# Patient Record
Sex: MALE | Race: WHITE | NOT HISPANIC OR LATINO | Employment: OTHER | URBAN - METROPOLITAN AREA
[De-identification: names, ages, dates, MRNs, and addresses within clinical notes are randomized per-mention and may not be internally consistent; named-entity substitution may affect disease eponyms.]

---

## 2017-11-02 ENCOUNTER — DOCUMENTATION ONLY (OUTPATIENT)
Dept: OTHER | Facility: CLINIC | Age: 82
End: 2017-11-02

## 2017-11-02 PROBLEM — Z71.89 ACP (ADVANCE CARE PLANNING): Chronic | Status: ACTIVE | Noted: 2017-11-02

## 2017-11-30 ENCOUNTER — DOCUMENTATION ONLY (OUTPATIENT)
Dept: OTHER | Facility: CLINIC | Age: 82
End: 2017-11-30

## 2018-10-26 ENCOUNTER — DOCUMENTATION ONLY (OUTPATIENT)
Dept: OTHER | Facility: CLINIC | Age: 83
End: 2018-10-26

## 2018-10-26 PROBLEM — Z71.89 ACP (ADVANCE CARE PLANNING): Chronic | Status: RESOLVED | Noted: 2017-11-02 | Resolved: 2018-10-26

## 2021-04-27 ENCOUNTER — DOCUMENTATION ONLY (OUTPATIENT)
Dept: OTHER | Facility: CLINIC | Age: 86
End: 2021-04-27

## 2021-07-13 ENCOUNTER — LAB REQUISITION (OUTPATIENT)
Dept: LAB | Facility: CLINIC | Age: 86
End: 2021-07-13
Payer: COMMERCIAL

## 2021-07-13 DIAGNOSIS — Z29.9 ENCOUNTER FOR PROPHYLACTIC MEASURES, UNSPECIFIED: ICD-10-CM

## 2021-07-13 PROCEDURE — U0003 INFECTIOUS AGENT DETECTION BY NUCLEIC ACID (DNA OR RNA); SEVERE ACUTE RESPIRATORY SYNDROME CORONAVIRUS 2 (SARS-COV-2) (CORONAVIRUS DISEASE [COVID-19]), AMPLIFIED PROBE TECHNIQUE, MAKING USE OF HIGH THROUGHPUT TECHNOLOGIES AS DESCRIBED BY CMS-2020-01-R: HCPCS | Mod: ORL | Performed by: NURSE PRACTITIONER

## 2021-07-15 LAB — SARS-COV-2 RNA RESP QL NAA+PROBE: NEGATIVE

## 2021-07-29 ENCOUNTER — LAB REQUISITION (OUTPATIENT)
Dept: LAB | Facility: CLINIC | Age: 86
End: 2021-07-29
Payer: COMMERCIAL

## 2021-07-29 DIAGNOSIS — Z29.9 ENCOUNTER FOR PROPHYLACTIC MEASURES, UNSPECIFIED: ICD-10-CM

## 2021-07-30 ENCOUNTER — LAB REQUISITION (OUTPATIENT)
Dept: LAB | Facility: CLINIC | Age: 86
End: 2021-07-30
Payer: COMMERCIAL

## 2021-07-30 DIAGNOSIS — Z29.9 ENCOUNTER FOR PROPHYLACTIC MEASURES, UNSPECIFIED: ICD-10-CM

## 2021-07-30 PROCEDURE — U0005 INFEC AGEN DETEC AMPLI PROBE: HCPCS | Mod: ORL | Performed by: NURSE PRACTITIONER

## 2021-07-31 LAB — SARS-COV-2 RNA RESP QL NAA+PROBE: NEGATIVE

## 2021-08-02 ENCOUNTER — LAB REQUISITION (OUTPATIENT)
Dept: LAB | Facility: CLINIC | Age: 86
End: 2021-08-02
Payer: COMMERCIAL

## 2021-08-02 DIAGNOSIS — Z29.9 ENCOUNTER FOR PROPHYLACTIC MEASURES, UNSPECIFIED: ICD-10-CM

## 2021-08-02 PROCEDURE — U0005 INFEC AGEN DETEC AMPLI PROBE: HCPCS | Mod: ORL | Performed by: NURSE PRACTITIONER

## 2021-08-03 LAB — SARS-COV-2 RNA RESP QL NAA+PROBE: NEGATIVE

## 2021-08-04 ENCOUNTER — LAB REQUISITION (OUTPATIENT)
Dept: LAB | Facility: CLINIC | Age: 86
End: 2021-08-04
Payer: COMMERCIAL

## 2021-08-04 DIAGNOSIS — Z29.9 ENCOUNTER FOR PROPHYLACTIC MEASURES, UNSPECIFIED: ICD-10-CM

## 2021-08-05 PROCEDURE — U0005 INFEC AGEN DETEC AMPLI PROBE: HCPCS | Mod: ORL | Performed by: NURSE PRACTITIONER

## 2021-08-06 LAB — SARS-COV-2 RNA RESP QL NAA+PROBE: NEGATIVE

## 2021-08-09 ENCOUNTER — LAB REQUISITION (OUTPATIENT)
Dept: LAB | Facility: CLINIC | Age: 86
End: 2021-08-09
Payer: MEDICARE

## 2021-08-09 DIAGNOSIS — Z29.9 ENCOUNTER FOR PROPHYLACTIC MEASURES, UNSPECIFIED: ICD-10-CM

## 2021-08-09 PROCEDURE — U0003 INFECTIOUS AGENT DETECTION BY NUCLEIC ACID (DNA OR RNA); SEVERE ACUTE RESPIRATORY SYNDROME CORONAVIRUS 2 (SARS-COV-2) (CORONAVIRUS DISEASE [COVID-19]), AMPLIFIED PROBE TECHNIQUE, MAKING USE OF HIGH THROUGHPUT TECHNOLOGIES AS DESCRIBED BY CMS-2020-01-R: HCPCS | Mod: ORL | Performed by: NURSE PRACTITIONER

## 2021-08-10 LAB — SARS-COV-2 RNA RESP QL NAA+PROBE: NEGATIVE

## 2021-08-13 ENCOUNTER — LAB REQUISITION (OUTPATIENT)
Dept: LAB | Facility: CLINIC | Age: 86
End: 2021-08-13
Payer: MEDICARE

## 2021-08-13 DIAGNOSIS — Z29.9 ENCOUNTER FOR PROPHYLACTIC MEASURES, UNSPECIFIED: ICD-10-CM

## 2021-08-13 PROCEDURE — U0003 INFECTIOUS AGENT DETECTION BY NUCLEIC ACID (DNA OR RNA); SEVERE ACUTE RESPIRATORY SYNDROME CORONAVIRUS 2 (SARS-COV-2) (CORONAVIRUS DISEASE [COVID-19]), AMPLIFIED PROBE TECHNIQUE, MAKING USE OF HIGH THROUGHPUT TECHNOLOGIES AS DESCRIBED BY CMS-2020-01-R: HCPCS | Mod: ORL | Performed by: NURSE PRACTITIONER

## 2021-08-14 LAB — SARS-COV-2 RNA RESP QL NAA+PROBE: NEGATIVE

## 2021-08-16 ENCOUNTER — LAB REQUISITION (OUTPATIENT)
Dept: LAB | Facility: CLINIC | Age: 86
End: 2021-08-16
Payer: MEDICARE

## 2021-08-16 DIAGNOSIS — Z29.9 ENCOUNTER FOR PROPHYLACTIC MEASURES, UNSPECIFIED: ICD-10-CM

## 2021-08-16 PROCEDURE — U0005 INFEC AGEN DETEC AMPLI PROBE: HCPCS | Mod: ORL | Performed by: NURSE PRACTITIONER

## 2021-08-17 LAB — SARS-COV-2 RNA RESP QL NAA+PROBE: NEGATIVE

## 2021-08-18 ENCOUNTER — LAB REQUISITION (OUTPATIENT)
Dept: LAB | Facility: CLINIC | Age: 86
End: 2021-08-18
Payer: MEDICARE

## 2021-08-18 DIAGNOSIS — Z29.9 ENCOUNTER FOR PROPHYLACTIC MEASURES, UNSPECIFIED: ICD-10-CM

## 2021-08-19 PROCEDURE — U0003 INFECTIOUS AGENT DETECTION BY NUCLEIC ACID (DNA OR RNA); SEVERE ACUTE RESPIRATORY SYNDROME CORONAVIRUS 2 (SARS-COV-2) (CORONAVIRUS DISEASE [COVID-19]), AMPLIFIED PROBE TECHNIQUE, MAKING USE OF HIGH THROUGHPUT TECHNOLOGIES AS DESCRIBED BY CMS-2020-01-R: HCPCS | Mod: ORL | Performed by: NURSE PRACTITIONER

## 2021-08-20 LAB — SARS-COV-2 RNA RESP QL NAA+PROBE: NEGATIVE

## 2021-08-23 ENCOUNTER — LAB REQUISITION (OUTPATIENT)
Dept: LAB | Facility: CLINIC | Age: 86
End: 2021-08-23
Payer: MEDICARE

## 2021-08-23 DIAGNOSIS — Z29.9 ENCOUNTER FOR PROPHYLACTIC MEASURES, UNSPECIFIED: ICD-10-CM

## 2021-08-24 PROCEDURE — U0003 INFECTIOUS AGENT DETECTION BY NUCLEIC ACID (DNA OR RNA); SEVERE ACUTE RESPIRATORY SYNDROME CORONAVIRUS 2 (SARS-COV-2) (CORONAVIRUS DISEASE [COVID-19]), AMPLIFIED PROBE TECHNIQUE, MAKING USE OF HIGH THROUGHPUT TECHNOLOGIES AS DESCRIBED BY CMS-2020-01-R: HCPCS | Mod: ORL | Performed by: NURSE PRACTITIONER

## 2021-08-25 LAB — SARS-COV-2 RNA RESP QL NAA+PROBE: NEGATIVE

## 2021-08-26 ENCOUNTER — LAB REQUISITION (OUTPATIENT)
Dept: LAB | Facility: CLINIC | Age: 86
End: 2021-08-26
Payer: MEDICARE

## 2021-08-26 DIAGNOSIS — Z29.9 ENCOUNTER FOR PROPHYLACTIC MEASURES, UNSPECIFIED: ICD-10-CM

## 2021-08-30 PROCEDURE — U0005 INFEC AGEN DETEC AMPLI PROBE: HCPCS | Mod: ORL | Performed by: NURSE PRACTITIONER

## 2021-08-31 LAB — SARS-COV-2 RNA RESP QL NAA+PROBE: NEGATIVE

## 2021-09-15 ENCOUNTER — LAB REQUISITION (OUTPATIENT)
Dept: LAB | Facility: CLINIC | Age: 86
End: 2021-09-15
Payer: MEDICARE

## 2021-09-15 DIAGNOSIS — Z29.9 ENCOUNTER FOR PROPHYLACTIC MEASURES, UNSPECIFIED: ICD-10-CM

## 2021-09-15 PROCEDURE — U0005 INFEC AGEN DETEC AMPLI PROBE: HCPCS | Mod: ORL | Performed by: NURSE PRACTITIONER

## 2021-09-16 LAB — SARS-COV-2 RNA RESP QL NAA+PROBE: NEGATIVE

## 2021-09-20 ENCOUNTER — LAB REQUISITION (OUTPATIENT)
Dept: LAB | Facility: CLINIC | Age: 86
End: 2021-09-20
Payer: MEDICARE

## 2021-09-20 DIAGNOSIS — Z29.9 ENCOUNTER FOR PROPHYLACTIC MEASURES, UNSPECIFIED: ICD-10-CM

## 2021-09-21 PROCEDURE — U0003 INFECTIOUS AGENT DETECTION BY NUCLEIC ACID (DNA OR RNA); SEVERE ACUTE RESPIRATORY SYNDROME CORONAVIRUS 2 (SARS-COV-2) (CORONAVIRUS DISEASE [COVID-19]), AMPLIFIED PROBE TECHNIQUE, MAKING USE OF HIGH THROUGHPUT TECHNOLOGIES AS DESCRIBED BY CMS-2020-01-R: HCPCS | Mod: ORL | Performed by: NURSE PRACTITIONER

## 2021-09-24 LAB
SARS-COV-2 RNA RESP QL NAA+PROBE: NOT DETECTED
SPECIMEN SOURCE: NORMAL

## 2021-09-27 ENCOUNTER — LAB REQUISITION (OUTPATIENT)
Dept: LAB | Facility: CLINIC | Age: 86
End: 2021-09-27
Payer: MEDICARE

## 2021-09-27 DIAGNOSIS — Z29.9 ENCOUNTER FOR PROPHYLACTIC MEASURES, UNSPECIFIED: ICD-10-CM

## 2021-09-28 PROCEDURE — U0005 INFEC AGEN DETEC AMPLI PROBE: HCPCS | Mod: ORL | Performed by: NURSE PRACTITIONER

## 2021-09-29 LAB — SARS-COV-2 RNA RESP QL NAA+PROBE: NEGATIVE

## 2021-10-28 ENCOUNTER — LAB REQUISITION (OUTPATIENT)
Dept: LAB | Facility: CLINIC | Age: 86
End: 2021-10-28
Payer: MEDICARE

## 2021-10-28 DIAGNOSIS — Z29.9 ENCOUNTER FOR PROPHYLACTIC MEASURES, UNSPECIFIED: ICD-10-CM

## 2021-10-28 PROCEDURE — U0003 INFECTIOUS AGENT DETECTION BY NUCLEIC ACID (DNA OR RNA); SEVERE ACUTE RESPIRATORY SYNDROME CORONAVIRUS 2 (SARS-COV-2) (CORONAVIRUS DISEASE [COVID-19]), AMPLIFIED PROBE TECHNIQUE, MAKING USE OF HIGH THROUGHPUT TECHNOLOGIES AS DESCRIBED BY CMS-2020-01-R: HCPCS | Mod: ORL | Performed by: NURSE PRACTITIONER

## 2021-10-29 LAB — SARS-COV-2 RNA RESP QL NAA+PROBE: NEGATIVE

## 2021-11-01 ENCOUNTER — LAB REQUISITION (OUTPATIENT)
Dept: LAB | Facility: CLINIC | Age: 86
End: 2021-11-01
Payer: MEDICARE

## 2021-11-01 DIAGNOSIS — Z29.9 ENCOUNTER FOR PROPHYLACTIC MEASURES, UNSPECIFIED: ICD-10-CM

## 2021-11-01 PROCEDURE — U0005 INFEC AGEN DETEC AMPLI PROBE: HCPCS | Mod: ORL | Performed by: NURSE PRACTITIONER

## 2021-11-03 ENCOUNTER — LAB REQUISITION (OUTPATIENT)
Dept: LAB | Facility: CLINIC | Age: 86
End: 2021-11-03
Payer: MEDICARE

## 2021-11-03 DIAGNOSIS — Z29.9 ENCOUNTER FOR PROPHYLACTIC MEASURES, UNSPECIFIED: ICD-10-CM

## 2021-11-03 LAB — SARS-COV-2 RNA RESP QL NAA+PROBE: NOT DETECTED

## 2021-11-04 PROCEDURE — U0005 INFEC AGEN DETEC AMPLI PROBE: HCPCS | Mod: ORL | Performed by: NURSE PRACTITIONER

## 2021-11-06 LAB — SARS-COV-2 RNA RESP QL NAA+PROBE: NOT DETECTED

## 2021-11-07 ENCOUNTER — LAB REQUISITION (OUTPATIENT)
Dept: LAB | Facility: CLINIC | Age: 86
End: 2021-11-07
Payer: MEDICARE

## 2021-11-07 DIAGNOSIS — Z29.9 ENCOUNTER FOR PROPHYLACTIC MEASURES, UNSPECIFIED: ICD-10-CM

## 2021-11-08 PROCEDURE — U0003 INFECTIOUS AGENT DETECTION BY NUCLEIC ACID (DNA OR RNA); SEVERE ACUTE RESPIRATORY SYNDROME CORONAVIRUS 2 (SARS-COV-2) (CORONAVIRUS DISEASE [COVID-19]), AMPLIFIED PROBE TECHNIQUE, MAKING USE OF HIGH THROUGHPUT TECHNOLOGIES AS DESCRIBED BY CMS-2020-01-R: HCPCS | Mod: ORL | Performed by: NURSE PRACTITIONER

## 2021-11-10 ENCOUNTER — LAB REQUISITION (OUTPATIENT)
Dept: LAB | Facility: CLINIC | Age: 86
End: 2021-11-10
Payer: MEDICARE

## 2021-11-10 DIAGNOSIS — Z29.9 ENCOUNTER FOR PROPHYLACTIC MEASURES, UNSPECIFIED: ICD-10-CM

## 2021-11-11 LAB — SARS-COV-2 RNA RESP QL NAA+PROBE: NOT DETECTED

## 2021-11-11 PROCEDURE — U0003 INFECTIOUS AGENT DETECTION BY NUCLEIC ACID (DNA OR RNA); SEVERE ACUTE RESPIRATORY SYNDROME CORONAVIRUS 2 (SARS-COV-2) (CORONAVIRUS DISEASE [COVID-19]), AMPLIFIED PROBE TECHNIQUE, MAKING USE OF HIGH THROUGHPUT TECHNOLOGIES AS DESCRIBED BY CMS-2020-01-R: HCPCS | Mod: ORL | Performed by: NURSE PRACTITIONER

## 2021-11-14 LAB — SARS-COV-2 RNA RESP QL NAA+PROBE: NEGATIVE

## 2021-12-15 ENCOUNTER — LAB REQUISITION (OUTPATIENT)
Dept: LAB | Facility: CLINIC | Age: 86
End: 2021-12-15
Payer: MEDICARE

## 2021-12-15 DIAGNOSIS — Z29.9 ENCOUNTER FOR PROPHYLACTIC MEASURES, UNSPECIFIED: ICD-10-CM

## 2021-12-15 PROCEDURE — U0005 INFEC AGEN DETEC AMPLI PROBE: HCPCS | Mod: ORL | Performed by: NURSE PRACTITIONER

## 2021-12-16 LAB — SARS-COV-2 RNA RESP QL NAA+PROBE: NEGATIVE

## 2021-12-20 ENCOUNTER — LAB REQUISITION (OUTPATIENT)
Dept: LAB | Facility: CLINIC | Age: 86
End: 2021-12-20
Payer: MEDICARE

## 2021-12-20 DIAGNOSIS — Z29.9 ENCOUNTER FOR PROPHYLACTIC MEASURES, UNSPECIFIED: ICD-10-CM

## 2021-12-21 PROCEDURE — U0003 INFECTIOUS AGENT DETECTION BY NUCLEIC ACID (DNA OR RNA); SEVERE ACUTE RESPIRATORY SYNDROME CORONAVIRUS 2 (SARS-COV-2) (CORONAVIRUS DISEASE [COVID-19]), AMPLIFIED PROBE TECHNIQUE, MAKING USE OF HIGH THROUGHPUT TECHNOLOGIES AS DESCRIBED BY CMS-2020-01-R: HCPCS | Mod: ORL | Performed by: NURSE PRACTITIONER

## 2021-12-22 LAB — SARS-COV-2 RNA RESP QL NAA+PROBE: NEGATIVE

## 2021-12-30 ENCOUNTER — LAB REQUISITION (OUTPATIENT)
Dept: LAB | Facility: CLINIC | Age: 86
End: 2021-12-30
Payer: MEDICARE

## 2021-12-30 DIAGNOSIS — Z29.9 ENCOUNTER FOR PROPHYLACTIC MEASURES, UNSPECIFIED: ICD-10-CM

## 2021-12-30 PROCEDURE — U0005 INFEC AGEN DETEC AMPLI PROBE: HCPCS | Mod: ORL | Performed by: NURSE PRACTITIONER

## 2021-12-31 LAB — SARS-COV-2 RNA RESP QL NAA+PROBE: NEGATIVE

## 2022-01-04 ENCOUNTER — LAB REQUISITION (OUTPATIENT)
Dept: LAB | Facility: CLINIC | Age: 87
End: 2022-01-04
Payer: MEDICARE

## 2022-01-04 DIAGNOSIS — Z29.9 ENCOUNTER FOR PROPHYLACTIC MEASURES, UNSPECIFIED: ICD-10-CM

## 2022-01-04 PROCEDURE — U0003 INFECTIOUS AGENT DETECTION BY NUCLEIC ACID (DNA OR RNA); SEVERE ACUTE RESPIRATORY SYNDROME CORONAVIRUS 2 (SARS-COV-2) (CORONAVIRUS DISEASE [COVID-19]), AMPLIFIED PROBE TECHNIQUE, MAKING USE OF HIGH THROUGHPUT TECHNOLOGIES AS DESCRIBED BY CMS-2020-01-R: HCPCS | Mod: ORL | Performed by: NURSE PRACTITIONER

## 2022-01-06 LAB — SARS-COV-2 RNA RESP QL NAA+PROBE: NEGATIVE

## 2022-01-11 ENCOUNTER — LAB REQUISITION (OUTPATIENT)
Dept: LAB | Facility: CLINIC | Age: 87
End: 2022-01-11
Payer: MEDICARE

## 2022-01-11 DIAGNOSIS — Z29.9 ENCOUNTER FOR PROPHYLACTIC MEASURES, UNSPECIFIED: ICD-10-CM

## 2022-01-11 PROCEDURE — U0003 INFECTIOUS AGENT DETECTION BY NUCLEIC ACID (DNA OR RNA); SEVERE ACUTE RESPIRATORY SYNDROME CORONAVIRUS 2 (SARS-COV-2) (CORONAVIRUS DISEASE [COVID-19]), AMPLIFIED PROBE TECHNIQUE, MAKING USE OF HIGH THROUGHPUT TECHNOLOGIES AS DESCRIBED BY CMS-2020-01-R: HCPCS | Mod: ORL | Performed by: NURSE PRACTITIONER

## 2022-01-12 LAB — SARS-COV-2 RNA RESP QL NAA+PROBE: NEGATIVE

## 2022-01-17 ENCOUNTER — LAB REQUISITION (OUTPATIENT)
Dept: LAB | Facility: CLINIC | Age: 87
End: 2022-01-17
Payer: MEDICARE

## 2022-01-17 DIAGNOSIS — Z29.9 ENCOUNTER FOR PROPHYLACTIC MEASURES, UNSPECIFIED: ICD-10-CM

## 2022-01-18 PROCEDURE — U0005 INFEC AGEN DETEC AMPLI PROBE: HCPCS | Mod: ORL | Performed by: NURSE PRACTITIONER

## 2022-01-19 LAB — SARS-COV-2 RNA RESP QL NAA+PROBE: NEGATIVE

## 2022-01-24 ENCOUNTER — LAB REQUISITION (OUTPATIENT)
Dept: LAB | Facility: CLINIC | Age: 87
End: 2022-01-24
Payer: MEDICARE

## 2022-01-24 DIAGNOSIS — Z29.9 ENCOUNTER FOR PROPHYLACTIC MEASURES, UNSPECIFIED: ICD-10-CM

## 2022-01-25 PROCEDURE — U0005 INFEC AGEN DETEC AMPLI PROBE: HCPCS | Mod: ORL | Performed by: NURSE PRACTITIONER

## 2022-01-26 LAB — SARS-COV-2 RNA RESP QL NAA+PROBE: NEGATIVE

## 2022-01-31 ENCOUNTER — LAB REQUISITION (OUTPATIENT)
Dept: LAB | Facility: CLINIC | Age: 87
End: 2022-01-31
Payer: MEDICARE

## 2022-01-31 DIAGNOSIS — Z29.9 ENCOUNTER FOR PROPHYLACTIC MEASURES, UNSPECIFIED: ICD-10-CM

## 2022-02-01 PROCEDURE — U0005 INFEC AGEN DETEC AMPLI PROBE: HCPCS | Mod: ORL | Performed by: NURSE PRACTITIONER

## 2022-02-02 LAB — SARS-COV-2 RNA RESP QL NAA+PROBE: NEGATIVE

## 2022-04-21 ENCOUNTER — LAB REQUISITION (OUTPATIENT)
Dept: LAB | Facility: CLINIC | Age: 87
End: 2022-04-21
Payer: MEDICARE

## 2022-04-21 DIAGNOSIS — Z29.9 ENCOUNTER FOR PROPHYLACTIC MEASURES, UNSPECIFIED: ICD-10-CM

## 2022-04-21 PROCEDURE — U0003 INFECTIOUS AGENT DETECTION BY NUCLEIC ACID (DNA OR RNA); SEVERE ACUTE RESPIRATORY SYNDROME CORONAVIRUS 2 (SARS-COV-2) (CORONAVIRUS DISEASE [COVID-19]), AMPLIFIED PROBE TECHNIQUE, MAKING USE OF HIGH THROUGHPUT TECHNOLOGIES AS DESCRIBED BY CMS-2020-01-R: HCPCS | Mod: ORL | Performed by: NURSE PRACTITIONER

## 2022-04-22 LAB — SARS-COV-2 RNA RESP QL NAA+PROBE: NEGATIVE

## 2022-04-25 ENCOUNTER — LAB REQUISITION (OUTPATIENT)
Dept: LAB | Facility: CLINIC | Age: 87
End: 2022-04-25
Payer: MEDICARE

## 2022-04-25 DIAGNOSIS — Z29.9 ENCOUNTER FOR PROPHYLACTIC MEASURES, UNSPECIFIED: ICD-10-CM

## 2022-04-25 PROCEDURE — U0003 INFECTIOUS AGENT DETECTION BY NUCLEIC ACID (DNA OR RNA); SEVERE ACUTE RESPIRATORY SYNDROME CORONAVIRUS 2 (SARS-COV-2) (CORONAVIRUS DISEASE [COVID-19]), AMPLIFIED PROBE TECHNIQUE, MAKING USE OF HIGH THROUGHPUT TECHNOLOGIES AS DESCRIBED BY CMS-2020-01-R: HCPCS | Mod: ORL | Performed by: NURSE PRACTITIONER

## 2022-04-26 LAB — SARS-COV-2 RNA RESP QL NAA+PROBE: NEGATIVE

## 2022-04-27 ENCOUNTER — LAB REQUISITION (OUTPATIENT)
Dept: LAB | Facility: CLINIC | Age: 87
End: 2022-04-27
Payer: MEDICARE

## 2022-04-27 DIAGNOSIS — Z29.9 ENCOUNTER FOR PROPHYLACTIC MEASURES, UNSPECIFIED: ICD-10-CM

## 2022-04-28 PROCEDURE — U0005 INFEC AGEN DETEC AMPLI PROBE: HCPCS | Mod: ORL | Performed by: NURSE PRACTITIONER

## 2022-04-29 LAB — SARS-COV-2 RNA RESP QL NAA+PROBE: NEGATIVE

## 2022-05-03 ENCOUNTER — LAB REQUISITION (OUTPATIENT)
Dept: LAB | Facility: CLINIC | Age: 87
End: 2022-05-03
Payer: MEDICARE

## 2022-05-03 DIAGNOSIS — Z29.9 ENCOUNTER FOR PROPHYLACTIC MEASURES, UNSPECIFIED: ICD-10-CM

## 2022-05-03 PROCEDURE — U0005 INFEC AGEN DETEC AMPLI PROBE: HCPCS | Mod: ORL | Performed by: NURSE PRACTITIONER

## 2022-05-04 LAB — SARS-COV-2 RNA RESP QL NAA+PROBE: NEGATIVE

## 2022-05-05 ENCOUNTER — LAB REQUISITION (OUTPATIENT)
Dept: LAB | Facility: CLINIC | Age: 87
End: 2022-05-05
Payer: MEDICARE

## 2022-05-05 DIAGNOSIS — Z29.9 ENCOUNTER FOR PROPHYLACTIC MEASURES, UNSPECIFIED: ICD-10-CM

## 2022-05-06 PROCEDURE — U0005 INFEC AGEN DETEC AMPLI PROBE: HCPCS | Mod: ORL | Performed by: NURSE PRACTITIONER

## 2022-05-07 LAB — SARS-COV-2 RNA RESP QL NAA+PROBE: NEGATIVE

## 2022-05-09 ENCOUNTER — LAB REQUISITION (OUTPATIENT)
Dept: LAB | Facility: CLINIC | Age: 87
End: 2022-05-09
Payer: MEDICARE

## 2022-05-09 DIAGNOSIS — Z29.9 ENCOUNTER FOR PROPHYLACTIC MEASURES, UNSPECIFIED: ICD-10-CM

## 2022-05-09 PROCEDURE — U0003 INFECTIOUS AGENT DETECTION BY NUCLEIC ACID (DNA OR RNA); SEVERE ACUTE RESPIRATORY SYNDROME CORONAVIRUS 2 (SARS-COV-2) (CORONAVIRUS DISEASE [COVID-19]), AMPLIFIED PROBE TECHNIQUE, MAKING USE OF HIGH THROUGHPUT TECHNOLOGIES AS DESCRIBED BY CMS-2020-01-R: HCPCS | Mod: ORL | Performed by: NURSE PRACTITIONER

## 2022-05-10 LAB — SARS-COV-2 RNA RESP QL NAA+PROBE: NEGATIVE

## 2022-05-11 ENCOUNTER — LAB REQUISITION (OUTPATIENT)
Dept: LAB | Facility: CLINIC | Age: 87
End: 2022-05-11
Payer: MEDICARE

## 2022-05-11 DIAGNOSIS — Z29.9 ENCOUNTER FOR PROPHYLACTIC MEASURES, UNSPECIFIED: ICD-10-CM

## 2022-05-12 PROCEDURE — U0005 INFEC AGEN DETEC AMPLI PROBE: HCPCS | Mod: ORL | Performed by: NURSE PRACTITIONER

## 2022-05-13 LAB — SARS-COV-2 RNA RESP QL NAA+PROBE: NEGATIVE

## 2022-05-16 ENCOUNTER — LAB REQUISITION (OUTPATIENT)
Dept: LAB | Facility: CLINIC | Age: 87
End: 2022-05-16
Payer: MEDICARE

## 2022-05-16 DIAGNOSIS — Z29.9 ENCOUNTER FOR PROPHYLACTIC MEASURES, UNSPECIFIED: ICD-10-CM

## 2022-05-16 PROCEDURE — U0003 INFECTIOUS AGENT DETECTION BY NUCLEIC ACID (DNA OR RNA); SEVERE ACUTE RESPIRATORY SYNDROME CORONAVIRUS 2 (SARS-COV-2) (CORONAVIRUS DISEASE [COVID-19]), AMPLIFIED PROBE TECHNIQUE, MAKING USE OF HIGH THROUGHPUT TECHNOLOGIES AS DESCRIBED BY CMS-2020-01-R: HCPCS | Mod: ORL | Performed by: NURSE PRACTITIONER

## 2022-05-17 LAB — SARS-COV-2 RNA RESP QL NAA+PROBE: NEGATIVE

## 2022-05-18 ENCOUNTER — LAB REQUISITION (OUTPATIENT)
Dept: LAB | Facility: CLINIC | Age: 87
End: 2022-05-18
Payer: MEDICARE

## 2022-05-18 DIAGNOSIS — Z29.9 ENCOUNTER FOR PROPHYLACTIC MEASURES, UNSPECIFIED: ICD-10-CM

## 2022-05-19 PROCEDURE — U0005 INFEC AGEN DETEC AMPLI PROBE: HCPCS | Mod: ORL | Performed by: NURSE PRACTITIONER

## 2022-05-20 LAB — SARS-COV-2 RNA RESP QL NAA+PROBE: NEGATIVE

## 2022-05-23 ENCOUNTER — LAB REQUISITION (OUTPATIENT)
Dept: LAB | Facility: CLINIC | Age: 87
End: 2022-05-23
Payer: MEDICARE

## 2022-05-23 DIAGNOSIS — Z29.9 ENCOUNTER FOR PROPHYLACTIC MEASURES, UNSPECIFIED: ICD-10-CM

## 2022-05-23 PROCEDURE — U0005 INFEC AGEN DETEC AMPLI PROBE: HCPCS | Mod: ORL | Performed by: NURSE PRACTITIONER

## 2022-05-24 LAB — SARS-COV-2 RNA RESP QL NAA+PROBE: NEGATIVE

## 2022-05-25 ENCOUNTER — LAB REQUISITION (OUTPATIENT)
Dept: LAB | Facility: CLINIC | Age: 87
End: 2022-05-25
Payer: MEDICARE

## 2022-05-25 DIAGNOSIS — Z29.9 ENCOUNTER FOR PROPHYLACTIC MEASURES, UNSPECIFIED: ICD-10-CM

## 2022-05-26 PROCEDURE — U0003 INFECTIOUS AGENT DETECTION BY NUCLEIC ACID (DNA OR RNA); SEVERE ACUTE RESPIRATORY SYNDROME CORONAVIRUS 2 (SARS-COV-2) (CORONAVIRUS DISEASE [COVID-19]), AMPLIFIED PROBE TECHNIQUE, MAKING USE OF HIGH THROUGHPUT TECHNOLOGIES AS DESCRIBED BY CMS-2020-01-R: HCPCS | Mod: ORL | Performed by: NURSE PRACTITIONER

## 2022-05-27 LAB — SARS-COV-2 RNA RESP QL NAA+PROBE: NEGATIVE

## 2022-05-31 ENCOUNTER — LAB REQUISITION (OUTPATIENT)
Dept: LAB | Facility: CLINIC | Age: 87
End: 2022-05-31
Payer: MEDICARE

## 2022-05-31 DIAGNOSIS — Z29.9 ENCOUNTER FOR PROPHYLACTIC MEASURES, UNSPECIFIED: ICD-10-CM

## 2022-05-31 PROCEDURE — U0003 INFECTIOUS AGENT DETECTION BY NUCLEIC ACID (DNA OR RNA); SEVERE ACUTE RESPIRATORY SYNDROME CORONAVIRUS 2 (SARS-COV-2) (CORONAVIRUS DISEASE [COVID-19]), AMPLIFIED PROBE TECHNIQUE, MAKING USE OF HIGH THROUGHPUT TECHNOLOGIES AS DESCRIBED BY CMS-2020-01-R: HCPCS | Mod: ORL | Performed by: NURSE PRACTITIONER

## 2022-06-01 LAB — SARS-COV-2 RNA RESP QL NAA+PROBE: NEGATIVE

## 2022-06-02 ENCOUNTER — LAB REQUISITION (OUTPATIENT)
Dept: LAB | Facility: CLINIC | Age: 87
End: 2022-06-02
Payer: MEDICARE

## 2022-06-02 DIAGNOSIS — Z29.9 ENCOUNTER FOR PROPHYLACTIC MEASURES, UNSPECIFIED: ICD-10-CM

## 2022-06-03 PROCEDURE — U0005 INFEC AGEN DETEC AMPLI PROBE: HCPCS | Mod: ORL | Performed by: NURSE PRACTITIONER

## 2022-06-04 LAB — SARS-COV-2 RNA RESP QL NAA+PROBE: NEGATIVE

## 2022-06-06 ENCOUNTER — LAB REQUISITION (OUTPATIENT)
Dept: LAB | Facility: CLINIC | Age: 87
End: 2022-06-06
Payer: MEDICARE

## 2022-06-06 DIAGNOSIS — Z29.9 ENCOUNTER FOR PROPHYLACTIC MEASURES, UNSPECIFIED: ICD-10-CM

## 2022-06-06 PROCEDURE — U0003 INFECTIOUS AGENT DETECTION BY NUCLEIC ACID (DNA OR RNA); SEVERE ACUTE RESPIRATORY SYNDROME CORONAVIRUS 2 (SARS-COV-2) (CORONAVIRUS DISEASE [COVID-19]), AMPLIFIED PROBE TECHNIQUE, MAKING USE OF HIGH THROUGHPUT TECHNOLOGIES AS DESCRIBED BY CMS-2020-01-R: HCPCS | Mod: ORL | Performed by: NURSE PRACTITIONER

## 2022-06-07 LAB — SARS-COV-2 RNA RESP QL NAA+PROBE: NEGATIVE

## 2022-06-08 ENCOUNTER — LAB REQUISITION (OUTPATIENT)
Dept: LAB | Facility: CLINIC | Age: 87
End: 2022-06-08
Payer: MEDICARE

## 2022-06-08 DIAGNOSIS — Z29.9 ENCOUNTER FOR PROPHYLACTIC MEASURES, UNSPECIFIED: ICD-10-CM

## 2022-06-09 PROCEDURE — U0005 INFEC AGEN DETEC AMPLI PROBE: HCPCS | Mod: ORL | Performed by: NURSE PRACTITIONER

## 2022-06-10 LAB — SARS-COV-2 RNA RESP QL NAA+PROBE: NEGATIVE

## 2022-06-13 ENCOUNTER — LAB REQUISITION (OUTPATIENT)
Dept: LAB | Facility: CLINIC | Age: 87
End: 2022-06-13
Payer: MEDICARE

## 2022-06-13 DIAGNOSIS — Z29.9 ENCOUNTER FOR PROPHYLACTIC MEASURES, UNSPECIFIED: ICD-10-CM

## 2022-06-13 PROCEDURE — U0003 INFECTIOUS AGENT DETECTION BY NUCLEIC ACID (DNA OR RNA); SEVERE ACUTE RESPIRATORY SYNDROME CORONAVIRUS 2 (SARS-COV-2) (CORONAVIRUS DISEASE [COVID-19]), AMPLIFIED PROBE TECHNIQUE, MAKING USE OF HIGH THROUGHPUT TECHNOLOGIES AS DESCRIBED BY CMS-2020-01-R: HCPCS | Mod: ORL | Performed by: NURSE PRACTITIONER

## 2022-06-14 LAB — SARS-COV-2 RNA RESP QL NAA+PROBE: NEGATIVE

## 2022-11-14 ENCOUNTER — LAB REQUISITION (OUTPATIENT)
Dept: LAB | Facility: CLINIC | Age: 87
End: 2022-11-14
Payer: MEDICARE

## 2022-11-14 DIAGNOSIS — Z29.9 ENCOUNTER FOR PROPHYLACTIC MEASURES, UNSPECIFIED: ICD-10-CM

## 2022-11-14 LAB
ANION GAP SERPL CALCULATED.3IONS-SCNC: 5 MMOL/L (ref 3–14)
BUN SERPL-MCNC: 24 MG/DL (ref 7–30)
CALCIUM SERPL-MCNC: 9.5 MG/DL (ref 8.5–10.1)
CHLORIDE BLD-SCNC: 106 MMOL/L (ref 94–109)
CO2 SERPL-SCNC: 29 MMOL/L (ref 20–32)
CREAT SERPL-MCNC: 0.75 MG/DL (ref 0.66–1.25)
GFR SERPL CREATININE-BSD FRML MDRD: 86 ML/MIN/1.73M2
GLUCOSE BLD-MCNC: 112 MG/DL (ref 70–99)
POTASSIUM BLD-SCNC: 4.3 MMOL/L (ref 3.4–5.3)
SODIUM SERPL-SCNC: 140 MMOL/L (ref 133–144)

## 2022-11-14 PROCEDURE — 80048 BASIC METABOLIC PNL TOTAL CA: CPT | Mod: ORL | Performed by: NURSE PRACTITIONER

## 2022-11-14 PROCEDURE — 36415 COLL VENOUS BLD VENIPUNCTURE: CPT | Mod: ORL | Performed by: NURSE PRACTITIONER

## 2022-12-28 ENCOUNTER — LAB REQUISITION (OUTPATIENT)
Dept: LAB | Facility: CLINIC | Age: 87
End: 2022-12-28
Payer: MEDICARE

## 2022-12-28 DIAGNOSIS — Z29.9 ENCOUNTER FOR PROPHYLACTIC MEASURES, UNSPECIFIED: ICD-10-CM

## 2022-12-28 PROCEDURE — 87637 SARSCOV2&INF A&B&RSV AMP PRB: CPT | Mod: ORL | Performed by: NURSE PRACTITIONER

## 2022-12-29 LAB
FLUAV RNA SPEC QL NAA+PROBE: NEGATIVE
FLUBV RNA RESP QL NAA+PROBE: NEGATIVE
RSV RNA SPEC NAA+PROBE: NEGATIVE
SARS-COV-2 RNA RESP QL NAA+PROBE: NEGATIVE

## 2023-01-01 ENCOUNTER — NURSING HOME VISIT (OUTPATIENT)
Dept: GERIATRICS | Facility: CLINIC | Age: 88
End: 2023-01-01
Payer: MEDICARE

## 2023-01-01 ENCOUNTER — DOCUMENTATION ONLY (OUTPATIENT)
Dept: GERIATRICS | Facility: CLINIC | Age: 88
End: 2023-01-01
Payer: MEDICARE

## 2023-01-01 ENCOUNTER — TELEPHONE (OUTPATIENT)
Dept: GERIATRICS | Facility: CLINIC | Age: 88
End: 2023-01-01

## 2023-01-01 VITALS
RESPIRATION RATE: 18 BRPM | SYSTOLIC BLOOD PRESSURE: 150 MMHG | TEMPERATURE: 96.2 F | WEIGHT: 196.5 LBS | DIASTOLIC BLOOD PRESSURE: 58 MMHG | BODY MASS INDEX: 27.51 KG/M2 | OXYGEN SATURATION: 96 % | HEIGHT: 71 IN | HEART RATE: 52 BPM

## 2023-01-01 VITALS
WEIGHT: 181.8 LBS | BODY MASS INDEX: 25.45 KG/M2 | HEART RATE: 93 BPM | DIASTOLIC BLOOD PRESSURE: 75 MMHG | HEIGHT: 71 IN | SYSTOLIC BLOOD PRESSURE: 140 MMHG | TEMPERATURE: 97.5 F | RESPIRATION RATE: 18 BRPM | OXYGEN SATURATION: 94 %

## 2023-01-01 VITALS
RESPIRATION RATE: 18 BRPM | WEIGHT: 194.1 LBS | SYSTOLIC BLOOD PRESSURE: 180 MMHG | HEART RATE: 57 BPM | BODY MASS INDEX: 27.17 KG/M2 | OXYGEN SATURATION: 95 % | DIASTOLIC BLOOD PRESSURE: 97 MMHG | TEMPERATURE: 97.7 F | HEIGHT: 71 IN

## 2023-01-01 DIAGNOSIS — I10 PRIMARY HYPERTENSION: ICD-10-CM

## 2023-01-01 DIAGNOSIS — F32.A DEPRESSION, UNSPECIFIED DEPRESSION TYPE: ICD-10-CM

## 2023-01-01 DIAGNOSIS — F03.90 DEMENTIA WITHOUT BEHAVIORAL DISTURBANCE (H): Primary | ICD-10-CM

## 2023-01-01 DIAGNOSIS — Z86.79 HISTORY OF CORONARY ARTERY DISEASE: ICD-10-CM

## 2023-01-01 DIAGNOSIS — C61 PROSTATE CANCER METASTATIC TO MULTIPLE SITES (H): ICD-10-CM

## 2023-01-01 DIAGNOSIS — H91.90 HEARING LOSS, UNSPECIFIED HEARING LOSS TYPE, UNSPECIFIED LATERALITY: ICD-10-CM

## 2023-01-01 DIAGNOSIS — E78.5 HYPERLIPIDEMIA LDL GOAL <100: ICD-10-CM

## 2023-01-01 PROCEDURE — 99307 SBSQ NF CARE SF MDM 10: CPT | Performed by: INTERNAL MEDICINE

## 2023-01-01 PROCEDURE — 99308 SBSQ NF CARE LOW MDM 20: CPT | Performed by: INTERNAL MEDICINE

## 2023-01-01 PROCEDURE — 99310 SBSQ NF CARE HIGH MDM 45: CPT

## 2023-01-01 PROCEDURE — 99309 SBSQ NF CARE MODERATE MDM 30: CPT

## 2023-02-03 ENCOUNTER — DOCUMENTATION ONLY (OUTPATIENT)
Dept: OTHER | Facility: CLINIC | Age: 88
End: 2023-02-03
Payer: MEDICARE

## 2023-04-12 ENCOUNTER — NURSING HOME VISIT (OUTPATIENT)
Dept: GERIATRICS | Facility: CLINIC | Age: 88
End: 2023-04-12
Payer: MEDICARE

## 2023-04-12 VITALS
BODY MASS INDEX: 24.64 KG/M2 | WEIGHT: 176 LBS | SYSTOLIC BLOOD PRESSURE: 118 MMHG | TEMPERATURE: 98.1 F | RESPIRATION RATE: 17 BRPM | HEART RATE: 59 BPM | DIASTOLIC BLOOD PRESSURE: 48 MMHG | HEIGHT: 71 IN | OXYGEN SATURATION: 95 %

## 2023-04-12 DIAGNOSIS — Z87.898 HISTORY OF BRADYCARDIA: ICD-10-CM

## 2023-04-12 DIAGNOSIS — I10 PRIMARY HYPERTENSION: ICD-10-CM

## 2023-04-12 DIAGNOSIS — F03.90 DEMENTIA WITHOUT BEHAVIORAL DISTURBANCE (H): Primary | ICD-10-CM

## 2023-04-12 DIAGNOSIS — E78.5 HYPERLIPIDEMIA LDL GOAL <100: ICD-10-CM

## 2023-04-12 DIAGNOSIS — N40.0 BENIGN PROSTATIC HYPERPLASIA, UNSPECIFIED WHETHER LOWER URINARY TRACT SYMPTOMS PRESENT: ICD-10-CM

## 2023-04-12 DIAGNOSIS — I95.1 ORTHOSTATIC HYPOTENSION: ICD-10-CM

## 2023-04-12 DIAGNOSIS — C61 PROSTATE CANCER METASTATIC TO MULTIPLE SITES (H): ICD-10-CM

## 2023-04-12 DIAGNOSIS — H91.90 HEARING LOSS, UNSPECIFIED HEARING LOSS TYPE, UNSPECIFIED LATERALITY: ICD-10-CM

## 2023-04-12 DIAGNOSIS — L85.3 XEROSIS CUTIS: ICD-10-CM

## 2023-04-12 DIAGNOSIS — F32.A DEPRESSION, UNSPECIFIED DEPRESSION TYPE: ICD-10-CM

## 2023-04-12 DIAGNOSIS — Z86.79 HISTORY OF CORONARY ARTERY DISEASE: ICD-10-CM

## 2023-04-12 PROBLEM — R79.89 LACTATE BLOOD INCREASE: Status: ACTIVE | Noted: 2018-05-11

## 2023-04-12 PROBLEM — R55 SYNCOPE AND COLLAPSE: Status: ACTIVE | Noted: 2018-05-11

## 2023-04-12 PROBLEM — R11.10 VOMITING: Status: ACTIVE | Noted: 2017-12-22

## 2023-04-12 PROBLEM — R53.1 WEAKNESS: Status: ACTIVE | Noted: 2017-12-22

## 2023-04-12 PROCEDURE — 99309 SBSQ NF CARE MODERATE MDM 30: CPT

## 2023-04-12 RX ORDER — TAMSULOSIN HYDROCHLORIDE 0.4 MG/1
0.4 CAPSULE ORAL DAILY
COMMUNITY

## 2023-04-12 RX ORDER — CITALOPRAM HYDROBROMIDE 10 MG/1
10 TABLET ORAL DAILY
COMMUNITY

## 2023-04-12 RX ORDER — AMOXICILLIN 250 MG
1 CAPSULE ORAL DAILY
COMMUNITY

## 2023-04-12 NOTE — PROGRESS NOTES
[facility] GERIATRICS    PRIMARY CARE PROVIDER AND CLINIC:  RODRI Man CNP, [address] / [place] [address]  Chief Complaint   Patient presents with     [facility] Medical Record Number:  [id number]  Place of Service where encounter took place:  [facility] () [[id number]]    Chetan Heard  is a 89 year old  (12/27/1931), admitted to the above facility on 4/11/23.     HPI:    Past medical history includes CAD, HLD, hypertension, prostate cancer, dementia, [place], depression admitted to the facility following his previous facility closure ( [facility]) .  Discharge summary not available to me yet.    Patient is being seen today for care establishment.  He is sitting up in wheelchair in the common area.  History is limited.  Very hard of hearing.  Endorses no acute concerns.  He is eating fine per staff report.  Mood stable.  Hemodynamically stable.  Denies chest pain or shortness of breath.    CODE STATUS/ADVANCE DIRECTIVES DISCUSSION:  DNR only  ALLERGIES: No Known Allergies   PAST MEDICAL HISTORY: No past medical history on file.   PAST SURGICAL HISTORY:   has no past surgical history on file.  FAMILY HISTORY: family history is not on file.  SOCIAL HISTORY:     Patient's living condition: lives in a skilled nursing facility    Post Discharge Medication Reconciliation Status:   MED REC REQUIRED  Post Medication Reconciliation Status:  Discharge medications reconciled, continue medications without change       Current Outpatient Medications   Medication Sig     aspirin (ASA) 81 MG EC tablet Take 81 mg by mouth daily     citalopram (CELEXA) 10 MG tablet Take 10 mg by mouth daily     senna-docusate (SENOKOT-S/PERICOLACE) 8.6-50 MG tablet Take 1 tablet by mouth daily     tamsulosin (FLOMAX) 0.4 MG capsule Take 0.4 mg by mouth daily     No current facility-administered medications for this visit.       ROS:  Limited secondary to cognitive impairment but today pt reports  "fine.     Vitals:  /48   Pulse 59   Temp 98.1  F (36.7  C)   Resp 17   Ht 1.803 m (5' 11\")   Wt 79.8 kg (176 lb)   SpO2 95%   BMI 24.55 kg/m       Exam:  GENERAL APPEARANCE: In no acute distress, up in wheelchair, well groomed  HEENT-EARS: No discharge/drainage, [place]  HEENT-NECK: No lymphadenopathy  HEENT-EYES: PERRLA positive, no drainage/discharge  HEENT-NOSE/MOUTH/THROAT: No nasal drainage or erythema, mucous membranes moist  RESPIRATORY: Clear to auscultation, even and unlabored, symmetrical chest wall expansion  CARDIOVASCULAR: RRR, BLE nonpitting edema, S1/S2 normal  GASTROINTESTINAL: Soft, nontender, nondistended, bowel sounds present x4 quadrants  GENITOURINARY: No retention  MUSCULOSKELETAL: No joint deformities, gait not assessed  INTEGUMENTARY: BLE skin dry  NEUROLOGICAL: Alert to self, memory loss, confusion  PSYCHOLOGICAL: Cooperative, socially appropriate      Lab/Diagnostic data:  Recent labs in [facility] reviewed by me today.     ASSESSMENT/PLAN:  Dementia  Depression  [place]  -On no specific dementia medication, mood is stable  -Continue Celexa, assist with ADLs as indicated, monitor for changes in mood, behavior, and functional status    History of prostate cancer  BPH  -Previously graduated from hospice per chart review, no report of  symptoms  -Continue tamsulosin, PVR as needed, monitor for signs of retention    History of CAD  Hypertension  History of orthostatic hypotension  History of bradycardia  -Vitals stable since admitted to the facility, on no BP medications  -Continue aspirin, monitor BP and pulse, monitor for changes in condition    Hyperlipidemia  -On no statin    Slow transit constipation  -Stable on senna  -Monitor    Xerosis  -BLE dry skin  -Initiate emollient lotion            Electronically signed by:  Judith Toledo,DNP,APRN,AGNP-BC.             The above note was completed in part using Dragon voice recognition software. Although reviewed after completion, some word and " grammatical errors may occur. Please contact the author of this note with any questions.               The above note was completed in part using Dragon voice recognition software. Although reviewed after completion, some word and grammatical errors may occur. Please contact the author of this note with any questions.

## 2023-04-12 NOTE — LETTER
4/12/2023        RE: Chetan Heard  C/o Jina Heard  [address] [address]        [facility] GERIATRICS    PRIMARY CARE PROVIDER AND CLINIC:  RODRI Man [facility], [address] / [place] [address]  Chief Complaint   Patient presents with     [facility] Medical Record Number:  [id number]  Place of Service where encounter took place:  [facility] () [[id number]]    Chetan Heard  is a 89 year old  (12/27/1931), admitted to the above facility on 4/11/23.     HPI:    Past medical history includes CAD, HLD, hypertension, prostate cancer, dementia, [place], depression admitted to the facility following his previous facility closure ( [facility]) .  Discharge summary not available to me yet.    Patient is being seen today for care establishment.  He is sitting up in wheelchair in the common area.  History is limited.  Very hard of hearing.  Endorses no acute concerns.  He is eating fine per staff report.  Mood stable.  Hemodynamically stable.  Denies chest pain or shortness of breath.    CODE STATUS/ADVANCE DIRECTIVES DISCUSSION:  DNR only  ALLERGIES: No Known Allergies   PAST MEDICAL HISTORY: No past medical history on file.   PAST SURGICAL HISTORY:   has no past surgical history on file.  FAMILY HISTORY: family history is not on file.  SOCIAL HISTORY:     Patient's living condition: lives in a skilled nursing facility    Post Discharge Medication Reconciliation Status:   MED REC REQUIRED  Post Medication Reconciliation Status:  Discharge medications reconciled, continue medications without change       Current Outpatient Medications   Medication Sig     aspirin (ASA) 81 MG EC tablet Take 81 mg by mouth daily     citalopram (CELEXA) 10 MG tablet Take 10 mg by mouth daily     senna-docusate (SENOKOT-S/PERICOLACE) 8.6-50 MG tablet Take 1 tablet by mouth daily     tamsulosin (FLOMAX) 0.4 MG capsule Take 0.4 mg by mouth daily     No current  "facility-administered medications for this visit.       ROS:  Limited secondary to cognitive impairment but today pt reports fine.     Vitals:  /48   Pulse 59   Temp 98.1  F (36.7  C)   Resp 17   Ht 1.803 m (5' 11\")   Wt 79.8 kg (176 lb)   SpO2 95%   BMI 24.55 kg/m       Exam:  GENERAL APPEARANCE: In no acute distress, up in wheelchair, well groomed  HEENT-EARS: No discharge/drainage, [place]  HEENT-NECK: No lymphadenopathy  HEENT-EYES: PERRLA positive, no drainage/discharge  HEENT-NOSE/MOUTH/THROAT: No nasal drainage or erythema, mucous membranes moist  RESPIRATORY: Clear to auscultation, even and unlabored, symmetrical chest wall expansion  CARDIOVASCULAR: RRR, BLE nonpitting edema, S1/S2 normal  GASTROINTESTINAL: Soft, nontender, nondistended, bowel sounds present x4 quadrants  GENITOURINARY: No retention  MUSCULOSKELETAL: No joint deformities, gait not assessed  INTEGUMENTARY: BLE skin dry  NEUROLOGICAL: Alert to self, memory loss, confusion  PSYCHOLOGICAL: Cooperative, socially appropriate      Lab/Diagnostic data:  Recent labs in [facility] reviewed by me today.     ASSESSMENT/PLAN:  Dementia  Depression  [place]  -On no specific dementia medication, mood is stable  -Continue Celexa, assist with ADLs as indicated, monitor for changes in mood, behavior, and functional status    History of prostate cancer  BPH  -Previously graduated from hospice per chart review, no report of  symptoms  -Continue tamsulosin, PVR as needed, monitor for signs of retention    History of CAD  Hypertension  History of orthostatic hypotension  History of bradycardia  -Vitals stable since admitted to the facility, on no BP medications  -Continue aspirin, monitor BP and pulse, monitor for changes in condition    Hyperlipidemia  -On no statin    Slow transit constipation  -Stable on senna  -Monitor    Xerosis  -BLE dry skin  -Initiate emollient lotion            Electronically signed by:  Judith Toledo,DNP,APRN,AGNP-BC.             The " above note was completed in part using Dragon voice recognition software. Although reviewed after completion, some word and grammatical errors may occur. Please contact the author of this note with any questions.               The above note was completed in part using Dragon voice recognition software. Although reviewed after completion, some word and grammatical errors may occur. Please contact the author of this note with any questions.           Sincerely,        RODRI Man CNP

## 2023-05-17 NOTE — PROGRESS NOTES
[facility] GERIATRICS    PRIMARY CARE PROVIDER AND CLINIC:  Judith Toledo, RODRI CNP, [address] / [place] [address]  Chief Complaint   Patient presents with     [facility] Medical Record Number:  [id number]  Place of Service where encounter took place:  [facility] ()     Chetan Heard  is a 89 year old  (12/27/1931), admitted to the above facility from [facility] on 4/11/23..     Past medical history reviewed:  Dementia  Coronary artery disease  Depression  Hypertension  History of prostate cancer,  Recent details not currently available    Interval history unobtainable from patient secondary to cognitive impairment.  Nursing staff notes no acute concerns.  Oral intake has been adequate.  Vital signs of been stable      CODE STATUS/ADVANCE DIRECTIVES DISCUSSION:  No Order  DNR / DNI  ALLERGIES: No Known Allergies   PAST MEDICAL HISTORY:   Past Medical History:   Diagnosis Date     CAD (coronary artery disease) 1/5/2011    Formatting of this note might be different from the original. Angiogram 7/1/92 1/28/11- noted ST depression with intense emotional distress about IV placement.    2/1/11 cor angio 3VD,  of LAD, BMS x3 to RCA, plavix minimum of one year . Plavix stopped 1/2014 10/2011 - cardiology planned to do a CT angiogram to determine the status of his right sided stents due to angina, however not done     Edema 1/12/2011    Formatting of this note might be different from the original. Initial work up: Bilateral doppler US 1/2011: Short segment occlusive thrombus in superficial gastrocnemius vein right upper medial calf. Lower extremities otherwise negative for DVT. BNP normal (39)  CXR normal Creatinine (12/11/2014): 0.77   Lasix started 9/22/2015 but did not improve edema so stopped.  Does not bother patient. Not se     Hyperlipidemia 1/31/2011    Formatting of this note might be different from the original. On high dose atorvastatin 80 mg Lipid  "panel (2013) - TChol: 171  Tri  HDL: 44  LDL: 114     Hypertension 2023    Formatting of this note might be different from the original. 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 [place] Grade 9 Diagnosed 2014 when presented with acute urinary retention, PSA >90, multiple lytic bone lesions on imaging. Follows with Dr. Billings. Started Degarelix therapy 2014 (subcutaneous injections, presently q6 months).     Weakness 2017      PAST SURGICAL HISTORY:   has a past surgical history that includes hernia repair (); tonsillectomy; and Cv Coronary Angiogram (2011).  FAMILY HISTORY: family history includes Alzheimer Disease in his mother; Atrial fibrillation in his sister; Pancreatic Cancer in his father.  SOCIAL HISTORY:   reports that he has never smoked. He has never used smokeless tobacco. He reports that he does not currently use alcohol. He reports that he does not use drugs.  Patient's living condition: lives in a skilled nursing facility    Medications were reviewed by me today    Current Outpatient Medications   Medication Sig     aspirin (ASA) 81 MG EC tablet Take 81 mg by mouth daily     citalopram (CELEXA) 10 MG tablet Take 10 mg by mouth daily     senna-docusate (SENOKOT-S/PERICOLACE) 8.6-50 MG tablet Take 1 tablet by mouth daily     tamsulosin (FLOMAX) 0.4 MG capsule Take 0.4 mg by mouth daily     No current facility-administered medications for this visit.       ROS:  Unobtainable secondary to cognitive impairment.     Vitals:  /49   Pulse 63   Temp 97.9  F (36.6  C)   Resp 16   Ht 1.803 m (5' 11\")   Wt 82.3 kg (181 lb 8 oz)   SpO2 92%   BMI 25.31 kg/m    Exam:  Well-nourished appearing male, lying in the recliner in the common area.  Sleepy, alerts to name, oriented to self  HEENT: Poor dentition, no oral lesions  Hard of " hearing  Lungs clear  CV RRR  Abdomen soft, nontender, nondistended  Extremities without edema  Neuro: Sleepy, alerts to name.  Moves all extremities equally.  No tremor or rigidity.  Cranial nerves intact  Gait was not assessed    Lab/Diagnostic data:    Most Recent 3 CBC's:No lab results found.  Most Recent 3 BMP's:Recent Labs   Lab Test 11/14/22  1348      POTASSIUM 4.3   CHLORIDE 106   CO2 29   BUN 24   CR 0.75   ANIONGAP 5   RONNA 9.5   *     Most Recent 2 LFT's:No lab results found.      ASSESSMENT/PLAN:    Dementia,  Depression   stable mental and functional status  Plan: Long-term care, continue citalopram    History carotid artery disease  Hypertension  CV status appears stable  Plan: Continue aspirin    History of prostate cancer with reported widespread mets, several years ago, previously on hospice.  Unclear if patient has had any recent evaluation  Clinically he appears stable and I doubt that further evaluation and treatment would be beneficial      Arley Chowdhury MD

## 2023-05-18 ENCOUNTER — NURSING HOME VISIT (OUTPATIENT)
Dept: GERIATRICS | Facility: CLINIC | Age: 88
End: 2023-05-18
Payer: MEDICARE

## 2023-05-18 VITALS
RESPIRATION RATE: 16 BRPM | TEMPERATURE: 97.9 F | HEART RATE: 63 BPM | BODY MASS INDEX: 25.41 KG/M2 | HEIGHT: 71 IN | WEIGHT: 181.5 LBS | SYSTOLIC BLOOD PRESSURE: 130 MMHG | DIASTOLIC BLOOD PRESSURE: 49 MMHG | OXYGEN SATURATION: 92 %

## 2023-05-18 DIAGNOSIS — C61 PROSTATE CANCER METASTATIC TO MULTIPLE SITES (H): ICD-10-CM

## 2023-05-18 DIAGNOSIS — I10 PRIMARY HYPERTENSION: ICD-10-CM

## 2023-05-18 DIAGNOSIS — F32.A DEPRESSION, UNSPECIFIED DEPRESSION TYPE: ICD-10-CM

## 2023-05-18 DIAGNOSIS — H91.90 HEARING LOSS, UNSPECIFIED HEARING LOSS TYPE, UNSPECIFIED LATERALITY: ICD-10-CM

## 2023-05-18 DIAGNOSIS — F03.90 DEMENTIA WITHOUT BEHAVIORAL DISTURBANCE (H): Primary | ICD-10-CM

## 2023-05-18 PROCEDURE — 99305 1ST NF CARE MODERATE MDM 35: CPT | Performed by: INTERNAL MEDICINE

## 2023-05-18 NOTE — LETTER
5/18/2023        RE: Chetan Heard  C/o Jina Heard  [address] [address]        [facility] GERIATRICS    PRIMARY CARE PROVIDER AND CLINIC:  RODRI Man CNP, [address] / [place] [address]  Chief Complaint   Patient presents with     [facility] Medical Record Number:  [id number]  Place of Service where encounter took place:  [facility] ([facility])     Chetan Heard  is a 89 year old  (12/27/1931), admitted to the above facility from [facility] on 4/11/23..     Past medical history reviewed:  Dementia  Coronary artery disease  Depression  Hypertension  History of prostate cancer,  Recent details not currently available    Interval history unobtainable from patient secondary to cognitive impairment.  Nursing staff notes no acute concerns.  Oral intake has been adequate.  Vital signs of been stable      CODE STATUS/ADVANCE DIRECTIVES DISCUSSION:  No Order  DNR / DNI  ALLERGIES: No Known Allergies   PAST MEDICAL HISTORY:   Past Medical History:   Diagnosis Date     CAD (coronary artery disease) 1/5/2011    Formatting of this note might be different from the original. Angiogram 7/1/92 1/28/11- noted ST depression with intense emotional distress about IV placement.    2/1/11 cor angio 3VD,  of LAD, BMS x3 to RCA, plavix minimum of one year . Plavix stopped 1/2014 10/2011 - cardiology planned to do a CT angiogram to determine the status of his right sided stents due to angina, however not done     Edema 1/12/2011    Formatting of this note might be different from the original. Initial work up: Bilateral doppler US 1/2011: Short segment occlusive thrombus in superficial gastrocnemius vein right upper medial calf. Lower extremities otherwise negative for DVT. BNP normal (39)  CXR normal Creatinine (12/11/2014): 0.77   Lasix started 9/22/2015 but did not improve edema so stopped.  Does not bother patient. Not se     Hyperlipidemia  "2011    Formatting of this note might be different from the original. On high dose atorvastatin 80 mg Lipid panel (2013) - TChol: 171  Tri  HDL: 44  LDL: 114     Hypertension 2023    Formatting of this note might be different from the original. 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 [place] Grade 9 Diagnosed 2014 when presented with acute urinary retention, PSA >90, multiple lytic bone lesions on imaging. Follows with Dr. Billings. Started Degarelix therapy 2014 (subcutaneous injections, presently q6 months).     Weakness 2017      PAST SURGICAL HISTORY:   has a past surgical history that includes hernia repair (); tonsillectomy; and Cv Coronary Angiogram (2011).  FAMILY HISTORY: family history includes Alzheimer Disease in his mother; Atrial fibrillation in his sister; Pancreatic Cancer in his father.  SOCIAL HISTORY:   reports that he has never smoked. He has never used smokeless tobacco. He reports that he does not currently use alcohol. He reports that he does not use drugs.  Patient's living condition: lives in a skilled nursing facility    Medications were reviewed by me today    Current Outpatient Medications   Medication Sig     aspirin (ASA) 81 MG EC tablet Take 81 mg by mouth daily     citalopram (CELEXA) 10 MG tablet Take 10 mg by mouth daily     senna-docusate (SENOKOT-S/PERICOLACE) 8.6-50 MG tablet Take 1 tablet by mouth daily     tamsulosin (FLOMAX) 0.4 MG capsule Take 0.4 mg by mouth daily     No current facility-administered medications for this visit.       ROS:  Unobtainable secondary to cognitive impairment.     Vitals:  /49   Pulse 63   Temp 97.9  F (36.6  C)   Resp 16   Ht 1.803 m (5' 11\")   Wt 82.3 kg (181 lb 8 oz)   SpO2 92%   BMI 25.31 kg/m    Exam:  Well-nourished appearing male, lying in the recliner " in the common area.  Sleepy, alerts to name, oriented to self  HEENT: Poor dentition, no oral lesions  Hard of hearing  Lungs clear  CV RRR  Abdomen soft, nontender, nondistended  Extremities without edema  Neuro: Sleepy, alerts to name.  Moves all extremities equally.  No tremor or rigidity.  Cranial nerves intact  Gait was not assessed    Lab/Diagnostic data:    Most Recent 3 CBC's:No lab results found.  Most Recent 3 BMP's:Recent Labs   Lab Test 11/14/22  1348      POTASSIUM 4.3   CHLORIDE 106   CO2 29   BUN 24   CR 0.75   ANIONGAP 5   RONNA 9.5   *     Most Recent 2 LFT's:No lab results found.      ASSESSMENT/PLAN:    Dementia,  Depression   stable mental and functional status  Plan: Long-term care, continue citalopram    History carotid artery disease  Hypertension  CV status appears stable  Plan: Continue aspirin    History of prostate cancer with reported widespread mets, several years ago, previously on hospice.  Unclear if patient has had any recent evaluation  Clinically he appears stable and I doubt that further evaluation and treatment would be beneficial      Arley Chowdhury MD                    Sincerely,        Arley Chowdhury MD

## 2023-06-16 NOTE — LETTER
6/16/2023        RE: Chetan Heard  C[facility]o Jina Heard  [address] [facility] [address]        [facility] GERIATRICS  Chief Complaint   Patient presents with     long-term Regulatory     [place] Medical Record Number:  [id number]  Place of Service where encounter took place:  [facility] () [[id number]]    HPI:    Chetan Heard  is 89 year old (12/27/1931), who is being seen today for a federally mandated E/M visit. Today's concerns are: LTC follow-up visit    Patient recently admitted to the above facility from [facility] [place] on 4/11.    Today, he is sitting in wheelchair in the common area.  History very limited.  Also, he is very hard of hearing.  Reports he is doing okay.  No changes in condition since last visit.  Course reviewed with nursing staff.  No acute concerns at this time.    ALLERGIES:Patient has no known allergies.  PAST MEDICAL HISTORY:   Past Medical History:   Diagnosis Date     CAD (coronary artery disease) 1/5/2011    Formatting of this note might be different from the original. Angiogram 7/1/92 1/28/11- noted ST depression with intense emotional distress about IV placement.    2/1/11 cor angio 3VD,  of LAD, BMS x3 to RCA, plavix minimum of one year . Plavix stopped 1/2014 10/2011 - cardiology planned to do a CT angiogram to determine the status of his right sided stents due to angina, however decided that h     Edema 1/12/2011    Formatting of this note might be different from the original. Initial work up: Bilateral doppler US 1/2011: Short segment occlusive thrombus in superficial gastrocnemius vein right upper medial calf. Lower extremities otherwise negative for DVT. BNP normal (39)  CXR normal Creatinine (12/11/2014): 0.77   Lasix started 9/22/2015 but did not improve edema so stopped.  Does not bother patient. Not se     Hyperlipidemia 1/31/2011    Formatting of this note might be different from the original. On high dose atorvastatin 80 mg  Lipid panel (2013) - TChol: 171  Tri  HDL: 44  LDL: 114     Hypertension 2023    Formatting of this note might be different from the original. 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 Sravanthi Grade 9 Diagnosed 2014 when presented with acute urinary retention, PSA >90, multiple lytic bone lesions on imaging. Follows with Dr. Billings. Started Degarelix therapy 2014 (subcutaneous injections, presently q6 months).     Weakness 2017     PAST SURGICAL HISTORY:   has a past surgical history that includes hernia repair (); tonsillectomy; and Cv Coronary Angiogram (2011).  FAMILY HISTORY: family history includes Alzheimer Disease in his mother; Atrial fibrillation in his sister; Pancreatic Cancer in his father.  SOCIAL HISTORY:  reports that he has never smoked. He has never used smokeless tobacco. He reports that he does not currently use alcohol. He reports that he does not use drugs.    MEDICATIONS:  MED REC REQUIRED  Post Medication Reconciliation Status:  Patient was not discharged from an inpatient facility or TCU           Review of your medicines          Accurate as of 2023 11:59 PM. If you have any questions, ask your nurse or doctor.            CONTINUE these medicines which have NOT CHANGED      Dose / Directions   aspirin 81 MG EC tablet  Commonly known as: ASA      Dose: 81 mg  Take 81 mg by mouth daily  Refills: 0     citalopram 10 MG tablet  Commonly known as: celeXA      Dose: 10 mg  Take 10 mg by mouth daily  Refills: 0     senna-docusate 8.6-50 MG tablet  Commonly known as: SENOKOT-S/PERICOLACE      Dose: 1 tablet  Take 1 tablet by mouth daily  Refills: 0     tamsulosin 0.4 MG capsule  Commonly known as: FLOMAX      Dose: 0.4 mg  Take 0.4 mg by mouth daily  Refills: 0           Case Management:  I have reviewed the care plan  "and MDS and do agree with the plan. Patient's desire to return to the community is not assessible due to cognitive impairment. Information reviewed:  Medications, vital signs, orders, and nursing notes.    ROS:  Limited secondary to cognitive impairment but today pt reports ok.     Vitals:  BP (!) 140/75   Pulse 93   Temp 97.5  F (36.4  C)   Resp 18   Ht 1.803 m (5' 11\")   Wt 82.5 kg (181 lb 12.8 oz)   SpO2 94%   BMI 25.36 kg/m    Body mass index is 25.36 kg/m .     Exam:  GENERAL APPEARANCE: In no acute distress, up in wheelchair, well groomed  RESPIRATORY: Clear to auscultation, even and unlabored, symmetrical chest wall expansion  CARDIOVASCULAR: RRR, BLE nonpitting edema, S1/S2 normal  GASTROINTESTINAL: Soft, nontender, nondistended, bowel sounds present x4 quadrants  MUSCULOSKELETAL: No joint deformities, gait not assessed  NEUROLOGICAL: Alert to self, memory loss, confusion  PSYCHOLOGICAL: Calm     Lab/Diagnostic data:   Recent labs in [facility] reviewed by me today.     ASSESSMENT/PLAN  Dementia  Depression  [place]  -On no specific dementia medication, mood is stable  -Continue Celexa, assist with ADLs as indicated, monitor for changes in mood, behavior, and functional status    Hx of CAD  Hypertension  -Vitals stable, on no BP medications  -Continue aspirin, monitor BP and pulse, monitor for changes in condition         Electronically signed by:  Judith Toledo DNP,RODRI,AGNP-BC.               The above note was completed in part using Dragon voice recognition software. Although reviewed after completion, some word and grammatical errors may occur. Please contact the author of this note with any questions.                 Sincerely,        RODRI Man CNP      "

## 2023-06-16 NOTE — TELEPHONE ENCOUNTER
[facility] Geriatrics Triage Nurse Telephone Encounter    Provider: RODRI Huntley CNP  Facility: [facility] Facility Type:  LTC    Caller: Arelis    Allergies:  No Known Allergies     Reason for call: Nurse calling to update PCP that pt had episode of large emesis x1 and large loose stool x1 this morning. Denies nausea or abdominal pain and no further vomiting or loose stools noted so far. Eating and drinking well. Afebrile and VSS. Did receive morning dose of scheduled Senna.     Verbal Order/Direction given by Provider:   - Monitor VS and for further episodes of loose stools or N/V and notify provider  - If loose stools continue, hold Senna x 24 hours until resolves    Provider giving Order:  RODRI Huntley CNP    Verbal Order given to: Arelis Stone RN

## 2023-07-11 NOTE — PROGRESS NOTES
Patient was seen for regulatory long-term care visit.    Course reviewed with nursing home staff    Overall mental functional status has been stable  Patient is unable to contribute to the history secondary to cognitive impairment    Vital signs have been stable    Patient is alert, sitting in a chair in the dining area, leaning forward  He is alert, able to state his name.  Hard of hearing  Face symmetric, affect blunted  Lungs clear  CV regular rhythm  Abdomen soft, protuberant  No lower extremity edema  Neuro: Face symmetric.  No gross focal weakness.        Assessment/plan    Dementia, advanced, stable status since admission to this nursing home  Plan: Continue long-term care, Celexa    Hypertension  History of coronary artery disease  CV status appears stable  Plan: Continue aspirin, monitor vitals

## 2023-09-12 NOTE — PROGRESS NOTES
[facility] GERIATRICS  Chief Complaint   Patient presents with    Annual Comprehensive Nursing Home     [place] Medical Record Number:  [id number]  Place of Service where encounter took place:  [facility] () [[id number]]    HPI:    Chetan Heard  is a 89 year old  (1931), who is being seen today for an annual comprehensive visit. HPI information obtained from: facility chart records, facility staff, patient report, and [facility] chart review.     Found sitting up in the common area.  No new medical issues since last visit.  History limited.  Very hard of hearing.  Denies any physical concerns.  Course reviewed with nursing staff.  Patient remained stable since admission.  Breathing fine.  No acute concerns.    ALLERGIES: Patient has no known allergies.  PAST MEDICAL HISTORY:   Past Medical History:   Diagnosis Date    CAD (coronary artery disease) 2011    Formatting of this note might be different from the original. Angiogram 92- noted ST depression with intense emotional distress about IV placement.    11 cor angio 3VD,  of LAD, BMS x3 to RCA, plavix minimum of one year . Plavix stopped 2014 10/2011 - cardiology planned to do a CT angiogram to determine the status of his right sided stents due to angina, however decided that h    Edema 2011    Formatting of this note might be different from the original. Initial work up: Bilateral doppler US 2011: Short segment occlusive thrombus in superficial gastrocnemius vein right upper medial calf. Lower extremities otherwise negative for DVT. BNP normal (39)  CXR normal Creatinine (2014): 0.77   Lasix started 2015 but did not improve edema so stopped.  Does not bother patient. Not se    Hyperlipidemia 2011    Formatting of this note might be different from the original. On high dose atorvastatin 80 mg Lipid panel (2013) - TChol: 171  Tri  HDL: 44  LDL: 114    Hypertension 2023     Formatting of this note might be different from the original. Imdur CR 90 mg    Mild dementia (H) 11/21/2016    Prostate cancer metastatic to multiple sites (H) 11/14/2014    Formatting of this note might be different from the original. Sravanthi Grade 9 Diagnosed 11/2014 when presented with acute urinary retention, PSA >90, multiple lytic bone lesions on imaging. Follows with Dr. Billings. Started Degarelix therapy 11/2014 (subcutaneous injections, presently q6 months).    Weakness 12/22/2017      PAST SURGICAL HISTORY:  has a past surgical history that includes hernia repair (2001); tonsillectomy; and Cv Coronary Angiogram (02/01/2011).      Current Outpatient Medications:     aspirin (ASA) 81 MG EC tablet, Take 81 mg by mouth daily, Disp: , Rfl:     citalopram (CELEXA) 10 MG tablet, Take 10 mg by mouth daily, Disp: , Rfl:     senna-docusate (SENOKOT-S/PERICOLACE) 8.6-50 MG tablet, Take 1 tablet by mouth daily, Disp: , Rfl:     tamsulosin (FLOMAX) 0.4 MG capsule, Take 0.4 mg by mouth daily, Disp: , Rfl:      MED REC REQUIRED  Post Medication Reconciliation Status:  Patient was not discharged from an inpatient facility or TCU    Case Management:  I have reviewed the facility/SNF care plan/MDS, including the falls risk, nutrition and pain screening. I also reviewed the current immunizations, and preventive care.. Future cancer screening is not clinically indicated secondary to age/goals of care. Patient's desire to return to the community is not assessible due to cognitive impairment. Current Level of Care is appropriate.    Advance Directive Discussion:    I reviewed the current advanced directives as reflected in EPIC, the POLST and the facility chart, and verified the congruency of orders . I contacted the first party  and discussed the plan of care. I did not due to cognitive impairment review the advance directives with the resident.     Team Discussion:  I communicated with the appropriate disciplines involved  "with the Plan of Care: Nursing  .   Patient's goal is: unobtainable secondary to cognitive impairment.  Information reviewed: Medications, vital signs, orders, and nursing notes.    ROS:  Limited secondary to cognitive impairment but today pt reports fine.    Vitals:  BP (!) 150/58   Pulse 52   Temp (!) 96.2  F (35.7  C)   Resp 18   Ht 1.803 m (5' 11\")   Wt 89.1 kg (196 lb 8 oz)   SpO2 96%   BMI 27.41 kg/m   Body mass index is 27.41 kg/m .    Exam:  GENERAL APPEARANCE: In no acute distress, up in w/c   HEENT-EARS: No discharge/drainage, [place]  HEENT-NECK: No lymphadenopathy  HEENT-EYES: PERRLA positive, no drainage/discharge  HEENT-NOSE/MOUTH/THROAT: No nasal drainage or erythema   RESPIRATORY: Clear to auscultation, even and unlabored, symmetrical chest wall expansion  CARDIOVASCULAR: RRR, BLE nonpitting edema, S1/S2 normal  GASTROINTESTINAL: Soft, nontender, nondistended, bowel sounds present x4 quadrants  MUSCULOSKELETAL: No joint deformities, gait not assessed  INTEGUMENTARY: Visualized areas intact, no open skin  NEUROLOGICAL: Alert to self, memory loss, confusion  PSYCHOLOGICAL: Cooperative, calm        Lab/Diagnostic data:   Recent labs in [facility] reviewed by me today.     ASSESSMENT/PLAN:  Dementia  Depression  [place]  -On no specific dementia medication, mood stable  -Continue Celexa, continue to assist with ADLs as indicated, monitor for changes in mood, behavior, and functional status     History of prostate cancer  BPH  -No reports of  symptoms  -Continue tamsulosin, PVR as needed, monitor for signs of retention     History of CAD  Hypertension  Hyperlipidemia  History of orthostatic hypotension  History of bradycardia  -Vitals stable on no BP medications, no reports of chest pain or shortness of breath  -Continue aspirin, monitor BP and pulse, monitor for changes in condition       Slow transit constipation  -Stable on senna  -Continue current care, monitor bowel function         Electronically signed " by:  Judith Toledo DNP,APRN,LAURIP-BC.               The above note was completed in part using Dragon voice recognition software. Although reviewed after completion, some word and grammatical errors may occur. Please contact the author of this note with any questions.

## 2023-09-12 NOTE — LETTER
9/12/2023        RE: Chetan Heard  C/o Jina Heard  [address] [address]        [facility] GERIATRICS  Chief Complaint   Patient presents with     Annual Comprehensive Nursing Home     [place] Medical Record Number:  [id number]  Place of Service where encounter took place:  [facility] () [[id number]]    HPI:    Chetan Heard  is a 89 year old  (12/27/1931), who is being seen today for an annual comprehensive visit. HPI information obtained from: facility chart records, facility staff, patient report, and [facility] chart review.     Found sitting up in the common area.  No new medical issues since last visit.  History limited.  Very hard of hearing.  Denies any physical concerns.  Course reviewed with nursing staff.  Patient remained stable since admission.  Breathing fine.  No acute concerns.    ALLERGIES: Patient has no known allergies.  PAST MEDICAL HISTORY:   Past Medical History:   Diagnosis Date     CAD (coronary artery disease) 1/5/2011    Formatting of this note might be different from the original. Angiogram 7/1/92 1/28/11- noted ST depression with intense emotional distress about IV placement.    2/1/11 cor angio 3VD,  of LAD, BMS x3 to RCA, plavix minimum of one year . Plavix stopped 1/2014 10/2011 - cardiology planned to do a CT angiogram to determine the status of his right sided stents due to angina, however decided that h     Edema 1/12/2011    Formatting of this note might be different from the original. Initial work up: Bilateral doppler US 1/2011: Short segment occlusive thrombus in superficial gastrocnemius vein right upper medial calf. Lower extremities otherwise negative for DVT. BNP normal (39)  CXR normal Creatinine (12/11/2014): 0.77   Lasix started 9/22/2015 but did not improve edema so stopped.  Does not bother patient. Not se     Hyperlipidemia 1/31/2011    Formatting of this note might be different from the original. On high dose  atorvastatin 80 mg Lipid panel (2013) - TChol: 171  Tri  HDL: 44  LDL: 114     Hypertension 2023    Formatting of this note might be different from the original. 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 [place] Grade 9 Diagnosed 2014 when presented with acute urinary retention, PSA >90, multiple lytic bone lesions on imaging. Follows with Dr. Billings. Started Degarelix therapy 2014 (subcutaneous injections, presently q6 months).     Weakness 2017      PAST SURGICAL HISTORY:  has a past surgical history that includes hernia repair (); tonsillectomy; and Cv Coronary Angiogram (2011).      Current Outpatient Medications:      aspirin (ASA) 81 MG EC tablet, Take 81 mg by mouth daily, Disp: , Rfl:      citalopram (CELEXA) 10 MG tablet, Take 10 mg by mouth daily, Disp: , Rfl:      senna-docusate (SENOKOT-S/PERICOLACE) 8.6-50 MG tablet, Take 1 tablet by mouth daily, Disp: , Rfl:      tamsulosin (FLOMAX) 0.4 MG capsule, Take 0.4 mg by mouth daily, Disp: , Rfl:      MED REC REQUIRED  Post Medication Reconciliation Status:  Patient was not discharged from an inpatient facility or TCU    Case Management:  I have reviewed the facility/SNF care plan/MDS, including the falls risk, nutrition and pain screening. I also reviewed the current immunizations, and preventive care.. Future cancer screening is not clinically indicated secondary to age/goals of care. Patient's desire to return to the community is not assessible due to cognitive impairment. Current Level of Care is appropriate.    Advance Directive Discussion:    I reviewed the current advanced directives as reflected in EPIC, the POLST and the facility chart, and verified the congruency of orders . I contacted the first party  and discussed the plan of care. I did not due to cognitive impairment review  "the advance directives with the resident.     Team Discussion:  I communicated with the appropriate disciplines involved with the Plan of Care: Nursing  .   Patient's goal is: unobtainable secondary to cognitive impairment.  Information reviewed: Medications, vital signs, orders, and nursing notes.    ROS:  Limited secondary to cognitive impairment but today pt reports fine.    Vitals:  BP (!) 150/58   Pulse 52   Temp (!) 96.2  F (35.7  C)   Resp 18   Ht 1.803 m (5' 11\")   Wt 89.1 kg (196 lb 8 oz)   SpO2 96%   BMI 27.41 kg/m   Body mass index is 27.41 kg/m .    Exam:  GENERAL APPEARANCE: In no acute distress, up in w/c   HEENT-EARS: No discharge/drainage, [place]  HEENT-NECK: No lymphadenopathy  HEENT-EYES: PERRLA positive, no drainage/discharge  HEENT-NOSE/MOUTH/THROAT: No nasal drainage or erythema   RESPIRATORY: Clear to auscultation, even and unlabored, symmetrical chest wall expansion  CARDIOVASCULAR: RRR, BLE nonpitting edema, S1/S2 normal  GASTROINTESTINAL: Soft, nontender, nondistended, bowel sounds present x4 quadrants  MUSCULOSKELETAL: No joint deformities, gait not assessed  INTEGUMENTARY: Visualized areas intact, no open skin  NEUROLOGICAL: Alert to self, memory loss, confusion  PSYCHOLOGICAL: Cooperative, calm        Lab/Diagnostic data:   Recent labs in [facility] reviewed by me today.     ASSESSMENT/PLAN:  Dementia  Depression  [place]  -On no specific dementia medication, mood stable  -Continue Celexa, continue to assist with ADLs as indicated, monitor for changes in mood, behavior, and functional status     History of prostate cancer  BPH  -No reports of  symptoms  -Continue tamsulosin, PVR as needed, monitor for signs of retention     History of CAD  Hypertension  Hyperlipidemia  History of orthostatic hypotension  History of bradycardia  -Vitals stable on no BP medications, no reports of chest pain or shortness of breath  -Continue aspirin, monitor BP and pulse, monitor for changes in condition   "     Slow transit constipation  -Stable on senna  -Continue current care, monitor bowel function         Electronically signed by:  Judith Toledo DNP,RODRI,AGNP-BC.               The above note was completed in part using Dragon voice recognition software. Although reviewed after completion, some word and grammatical errors may occur. Please contact the author of this note with any questions.           Sincerely,        RODRI Man CNP

## 2023-11-06 NOTE — PROGRESS NOTES
[facility] GERIATRICS  Chief Complaint   Patient presents with    [facility] Medical Record Number:  [id number]  Place of Service where encounter took place:  [facility] ()     HPI:    Chetan Heard  is 89 year old (1931), who is being seen today for a federally mandated E/M visit.     Course reviewed with nursing home staff    Interval status has been stable  Patient does not volunteer any specific physical concerns.  He denies pain, cough, chest pain, shortness of breath    ALLERGIES:Patient has no known allergies.  PAST MEDICAL HISTORY:   Past Medical History:   Diagnosis Date    CAD (coronary artery disease) 2011    Formatting of this note might be different from the original. Angiogram 92- noted ST depression with intense emotional distress about IV placement.    11 cor angio 3VD,  of LAD, BMS x3 to RCA, plavix minimum of one year . Plavix stopped 2014 10/2011 - cardiology planned to do a CT angiogram to determine the status of his right sided stents due to angina, however decided that h    Edema 2011    Formatting of this note might be different from the original. Initial work up: Bilateral doppler US 2011: Short segment occlusive thrombus in superficial gastrocnemius vein right upper medial calf. Lower extremities otherwise negative for DVT. BNP normal (39)  CXR normal Creatinine (2014): 0.77   Lasix started 2015 but did not improve edema so stopped.  Does not bother patient. Not se    Hyperlipidemia 2011    Formatting of this note might be different from the original. On high dose atorvastatin 80 mg Lipid panel (2013) - TChol: 171  Tri  HDL: 44  LDL: 114    Hypertension 2023    Formatting of this note might be different from the original. Imdur CR 90 mg    Mild dementia (H) 2016    Prostate cancer metastatic to multiple sites (H) 2014    Formatting of this note might be  "different from the original. Sravanthi Grade 9 Diagnosed 11/2014 when presented with acute urinary retention, PSA >90, multiple lytic bone lesions on imaging. Follows with Dr. Billings. Started Degarelix therapy 11/2014 (subcutaneous injections, presently q6 months).    Weakness 12/22/2017     Medications were reviewed by me today        Vitals:  BP (!) 180/97   Pulse 57   Temp 97.7  F (36.5  C)   Resp 18   Ht 1.803 m (5' 11\")   Wt 88 kg (194 lb 1.6 oz)   SpO2 95%   BMI 27.07 kg/m    Body mass index is 27.07 kg/m .  Exam:  Obese male, lying in the recliner in the common area  Sleepy, easy alerts to name  Oriented to self  Poor dentition, hard of hearing  Lungs clear  CV RRR  Abdomen soft  No extremity edema  Neuro: Alert, moves all extremities equally.        ASSESSMENT/PLAN    Dementia  Depression  -- Mental functional status appears stable  Plan: Continue Celexa    History of prostate cancer with bone mets  When last seen in urology 2018, no further treatment recommended in view of stable disease and noncompliance with monitoring.    History of coronary disease  Hypertension, blood pressure recently elevated, on the blood pressure medications  Hyperlipidemia  Not currently on medications to treat, possibly secondary to past noncompliance with medications and treatments  Currently on aspirin alone  Plan: Monitor blood pressure, consider treatment (would avoid beta-blockers given history of bradycardia), statin  Clarify goals of care with legal rep      Arley Chowdhury MD    "

## 2023-11-06 NOTE — LETTER
2023        RE: Chetan Heard  C/o Jina Heard  [address] [facility] [address]        [facility] GERIATRICS  Chief Complaint   Patient presents with     jail Regulatory     [place] Medical Record Number:  [id number]  Place of Service where encounter took place:  [facility] ()     HPI:    Chetan Heard  is 89 year old (1931), who is being seen today for a federally mandated E/M visit.     Course reviewed with nursing home staff    Interval status has been stable  Patient does not volunteer any specific physical concerns.  He denies pain, cough, chest pain, shortness of breath    ALLERGIES:Patient has no known allergies.  PAST MEDICAL HISTORY:   Past Medical History:   Diagnosis Date     CAD (coronary artery disease) 2011    Formatting of this note might be different from the original. Angiogram 92- noted ST depression with intense emotional distress about IV placement.    11 cor angio 3VD,  of LAD, BMS x3 to RCA, plavix minimum of one year . Plavix stopped 2014 10/2011 - cardiology planned to do a CT angiogram to determine the status of his right sided stents due to angina, however decided that h     Edema 2011    Formatting of this note might be different from the original. Initial work up: Bilateral doppler US 2011: Short segment occlusive thrombus in superficial gastrocnemius vein right upper medial calf. Lower extremities otherwise negative for DVT. BNP normal (39)  CXR normal Creatinine (2014): 0.77   Lasix started 2015 but did not improve edema so stopped.  Does not bother patient. Not se     Hyperlipidemia 2011    Formatting of this note might be different from the original. On high dose atorvastatin 80 mg Lipid panel (2013) - TChol: 171  Tri  HDL: 44  LDL: 114     Hypertension 2023    Formatting of this note might be different from the original. Imdur CR 90 mg     Mild dementia (H)  "11/21/2016     Prostate cancer metastatic to multiple sites (H) 11/14/2014    Formatting of this note might be different from the original. [place] Grade 9 Diagnosed 11/2014 when presented with acute urinary retention, PSA >90, multiple lytic bone lesions on imaging. Follows with Dr. Billings. Started Degarelix therapy 11/2014 (subcutaneous injections, presently q6 months).     Weakness 12/22/2017     Medications were reviewed by me today        Vitals:  BP (!) 180/97   Pulse 57   Temp 97.7  F (36.5  C)   Resp 18   Ht 1.803 m (5' 11\")   Wt 88 kg (194 lb 1.6 oz)   SpO2 95%   BMI 27.07 kg/m    Body mass index is 27.07 kg/m .  Exam:  Obese male, lying in the recliner in the common area  Sleepy, easy alerts to name  Oriented to self  Poor dentition, hard of hearing  Lungs clear  CV RRR  Abdomen soft  No extremity edema  Neuro: Alert, moves all extremities equally.        ASSESSMENT/PLAN    Dementia  Depression  -- Mental functional status appears stable  Plan: Continue Celexa    History of prostate cancer with bone mets  When last seen in urology 2018, no further treatment recommended in view of stable disease and noncompliance with monitoring.    History of coronary disease  Hypertension, blood pressure recently elevated, on the blood pressure medications  Hyperlipidemia  Not currently on medications to treat, possibly secondary to past noncompliance with medications and treatments  Currently on aspirin alone  Plan: Monitor blood pressure, consider treatment (would avoid beta-blockers given history of bradycardia), statin  Clarify goals of care with legal rep      Arley Chowdhury MD      Sincerely,        Arley Chowdhury MD      "

## 2024-01-01 ENCOUNTER — TRANSFERRED RECORDS (OUTPATIENT)
Dept: HEALTH INFORMATION MANAGEMENT | Facility: CLINIC | Age: 89
End: 2024-01-01
Payer: MEDICARE

## 2024-01-01 ENCOUNTER — TELEPHONE (OUTPATIENT)
Dept: GERIATRICS | Facility: CLINIC | Age: 89
End: 2024-01-01
Payer: MEDICARE

## 2024-01-01 ENCOUNTER — NURSING HOME VISIT (OUTPATIENT)
Dept: GERIATRICS | Facility: CLINIC | Age: 89
End: 2024-01-01
Payer: MEDICARE

## 2024-01-01 VITALS
HEIGHT: 71 IN | DIASTOLIC BLOOD PRESSURE: 59 MMHG | OXYGEN SATURATION: 96 % | SYSTOLIC BLOOD PRESSURE: 125 MMHG | WEIGHT: 194.2 LBS | HEART RATE: 54 BPM | TEMPERATURE: 98.1 F | BODY MASS INDEX: 27.19 KG/M2 | RESPIRATION RATE: 18 BRPM

## 2024-01-01 VITALS
DIASTOLIC BLOOD PRESSURE: 64 MMHG | BODY MASS INDEX: 27.5 KG/M2 | HEIGHT: 71 IN | WEIGHT: 196.4 LBS | RESPIRATION RATE: 18 BRPM | OXYGEN SATURATION: 92 % | SYSTOLIC BLOOD PRESSURE: 153 MMHG | HEART RATE: 60 BPM | TEMPERATURE: 97.3 F

## 2024-01-01 VITALS
SYSTOLIC BLOOD PRESSURE: 107 MMHG | RESPIRATION RATE: 16 BRPM | BODY MASS INDEX: 26.26 KG/M2 | WEIGHT: 187.6 LBS | HEIGHT: 71 IN | DIASTOLIC BLOOD PRESSURE: 51 MMHG | TEMPERATURE: 97.3 F | HEART RATE: 57 BPM | OXYGEN SATURATION: 94 %

## 2024-01-01 DIAGNOSIS — N40.0 BENIGN PROSTATIC HYPERPLASIA, UNSPECIFIED WHETHER LOWER URINARY TRACT SYMPTOMS PRESENT: ICD-10-CM

## 2024-01-01 DIAGNOSIS — C61 PROSTATE CANCER METASTATIC TO MULTIPLE SITES (H): ICD-10-CM

## 2024-01-01 DIAGNOSIS — Z87.898 HISTORY OF BRADYCARDIA: ICD-10-CM

## 2024-01-01 DIAGNOSIS — F03.90 DEMENTIA WITHOUT BEHAVIORAL DISTURBANCE (H): Primary | ICD-10-CM

## 2024-01-01 DIAGNOSIS — I95.1 ORTHOSTATIC HYPOTENSION: ICD-10-CM

## 2024-01-01 DIAGNOSIS — S81.812A SKIN TEAR OF LEFT LOWER LEG WITHOUT COMPLICATION, INITIAL ENCOUNTER: Primary | ICD-10-CM
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
## 2024-01-01 DIAGNOSIS — E78.5 HYPERLIPIDEMIA LDL GOAL <100: ICD-10-CM

## 2024-01-01 DIAGNOSIS — I10 PRIMARY HYPERTENSION: ICD-10-CM

## 2024-01-01 DIAGNOSIS — H91.90 HEARING LOSS, UNSPECIFIED HEARING LOSS TYPE, UNSPECIFIED LATERALITY: ICD-10-CM

## 2024-01-01 DIAGNOSIS — Z86.79 HISTORY OF CORONARY ARTERY DISEASE: ICD-10-CM

## 2024-01-01 DIAGNOSIS — F32.A DEPRESSION, UNSPECIFIED DEPRESSION TYPE: ICD-10-CM

## 2024-01-01 DIAGNOSIS — F41.9 ANXIETY: Primary | ICD-10-CM

## 2024-01-01 PROCEDURE — 99310 SBSQ NF CARE HIGH MDM 45: CPT

## 2024-01-01 PROCEDURE — 99309 SBSQ NF CARE MODERATE MDM 30: CPT

## 2024-01-01 PROCEDURE — 99308 SBSQ NF CARE LOW MDM 20: CPT

## 2024-01-01 PROCEDURE — 99307 SBSQ NF CARE SF MDM 10: CPT | Performed by: INTERNAL MEDICINE

## 2024-01-01 RX ORDER — LORAZEPAM 2 MG/ML
0.5 CONCENTRATE ORAL EVERY 4 HOURS PRN
Qty: 30 ML | Refills: 0 | Status: SHIPPED | OUTPATIENT
Start: 2024-01-01

## 2024-01-16 NOTE — LETTER
1/16/2024        RE: Chetan Heard  C/o Jina Heard  [address] [facility] [address]        [facility] GERIATRICS  Chief Complaint   Patient presents with     FCI Regulatory     [place] Medical Record Number:  [id number]  Place of Service where encounter took place:  [facility] () [[id number]]    HPI:    Chetan Heard  is 89 year old (12/27/1931), who is being seen today for a federally mandated E/M visit. Today's concerns are: Routine LTC follow up visit    Sitting up in wheelchair in the common area.  History limited.  No acute change since last visit.  Patient denies concerns.  Course reviewed with staff.  Stable mental and functional status.  Pulse 50s-70s, asymptomatic.  BP stable.  Denies chest pain or shortness of breath.  Appetite stable.  About 20 pounds weight gain since April 2023.    ALLERGIES:Patient has no known allergies.  PAST MEDICAL HISTORY:   Past Medical History:   Diagnosis Date     CAD (coronary artery disease) 1/5/2011    Formatting of this note might be different from the original. Angiogram 7/1/92 1/28/11- noted ST depression with intense emotional distress about IV placement.    2/1/11 cor angio 3VD,  of LAD, BMS x3 to RCA, plavix minimum of one year . Plavix stopped 1/2014 10/2011 - cardiology planned to do a CT angiogram to determine the status of his right sided stents due to angina, however decided that h     Edema 1/12/2011    Formatting of this note might be different from the original. Initial work up: Bilateral doppler US 1/2011: Short segment occlusive thrombus in superficial gastrocnemius vein right upper medial calf. Lower extremities otherwise negative for DVT. BNP normal (39)  CXR normal Creatinine (12/11/2014): 0.77   Lasix started 9/22/2015 but did not improve edema so stopped.  Does not bother patient. Not se     Hyperlipidemia 1/31/2011    Formatting of this note might be different from the original. On high dose atorvastatin  80 mg Lipid panel (2013) - TChol: 171  Tri  HDL: 44  LDL: 114     Hypertension 2023    Formatting of this note might be different from the original. 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 [place] Grade 9 Diagnosed 2014 when presented with acute urinary retention, PSA >90, multiple lytic bone lesions on imaging. Follows with Dr. Billings. Started Degarelix therapy 2014 (subcutaneous injections, presently q6 months).     Weakness 2017     PAST SURGICAL HISTORY:   has a past surgical history that includes hernia repair (); tonsillectomy; and Cv Coronary Angiogram (2011).  FAMILY HISTORY: family history includes Alzheimer Disease in his mother; Atrial fibrillation in his sister; Pancreatic Cancer in his father.  SOCIAL HISTORY:  reports that he has never smoked. He has never used smokeless tobacco. He reports that he does not currently use alcohol. He reports that he does not use drugs.    MEDICATIONS:  MED REC REQUIRED  Post Medication Reconciliation Status: patient was not discharged from an inpatient facility or TCU       Review of your medicines            Accurate as of 2024  8:40 AM. If you have any questions, ask your nurse or doctor.                CONTINUE these medicines which have NOT CHANGED        Dose / Directions   aspirin 81 MG EC tablet  Commonly known as: ASA      Dose: 81 mg  Take 81 mg by mouth daily  Refills: 0     citalopram 10 MG tablet  Commonly known as: celeXA      Dose: 10 mg  Take 10 mg by mouth daily  Refills: 0     senna-docusate 8.6-50 MG tablet  Commonly known as: SENOKOT-S/PERICOLACE      Dose: 1 tablet  Take 1 tablet by mouth daily  Refills: 0     tamsulosin 0.4 MG capsule  Commonly known as: FLOMAX      Dose: 0.4 mg  Take 0.4 mg by mouth daily  Refills: 0             Case Management:  I have reviewed  "the care plan and MDS and do agree with the plan. Patient's desire to return to the community is not assessible due to cognitive impairment. Information reviewed:  Medications, vital signs, orders, and nursing notes.    ROS:  Limited secondary to cognitive impairment but today pt reports no pain.     Vitals:  BP (!) 153/64   Pulse 60   Temp 97.3  F (36.3  C)   Resp 18   Ht 1.803 m (5' 11\")   Wt 89.1 kg (196 lb 6.4 oz)   SpO2 92%   BMI 27.39 kg/m    Body mass index is 27.39 kg/m .    Exam:  ENERAL APPEARANCE: NAD, in w/c  HEENT-EARS: No discharge/drainage, [place]   RESPIRATORY: Clear to auscultation, even and unlabored, symmetrical chest wall expansion  CARDIOVASCULAR: RRR, BLE nonpitting edema, S1/S2 normal  GASTROINTESTINAL: Soft, nontender, nondistended, bowel sounds active   MUSCULOSKELETAL: In w/c   INTEGUMENTARY: Visualized areas intact, no open skin  NEUROLOGICAL: Alert to self, memory loss, confusion  PSYCHOLOGICAL: Cooperative, calm     Lab/Diagnostic data:   Recent labs in [facility] reviewed by me today.      ASSESSMENT/PLAN  Dementia  Depression  [place]  -On no specific dementia medication, mood stable  -Continue Celexa 10 mg daily, continue to assist with ADLs as indicated, monitor for changes in mood, behavior, and functional status     History of prostate cancer  BPH  -No reports of  symptoms  -Continue tamsulosin 0.4 mg daily, PVR as needed, monitor for signs of retention     History of CAD  Hypertension  Hyperlipidemia  History of orthostatic hypotension  History of bradycardia  -Pulse as above, BP stable on no medications, no reports of chest pain or shortness of breath  -Continue aspirin, monitor BP and pulse, monitor for changes in condition     Slow transit constipation  -Stable on senna  -Continue senna 1 tab daily, monitor bowel function         Electronically signed by:  Judith Toledo,AYESHA,APRN,AGNP-BC.               The above note was completed in part using Dragon voice recognition software. " Although reviewed after completion, some word and grammatical errors may occur. Please contact the author of this note with any questions.             Sincerely,        RODRI Man CNP

## 2024-01-16 NOTE — PROGRESS NOTES
[facility] GERIATRICS  Chief Complaint   Patient presents with    correction Regulatory     [place] Medical Record Number:  [id number]  Place of Service where encounter took place:  [facility] () [[id number]]    HPI:    Chetan Heard  is 89 year old (1931), who is being seen today for a federally mandated E/M visit. Today's concerns are: Routine LTC follow up visit    Sitting up in wheelchair in the common area.  History limited.  No acute change since last visit.  Patient denies concerns.  Course reviewed with staff.  Stable mental and functional status.  Pulse 50s-70s, asymptomatic.  BP stable.  Denies chest pain or shortness of breath.  Appetite stable.  About 20 pounds weight gain since 2023.    ALLERGIES:Patient has no known allergies.  PAST MEDICAL HISTORY:   Past Medical History:   Diagnosis Date    CAD (coronary artery disease) 2011    Formatting of this note might be different from the original. Angiogram 92- noted ST depression with intense emotional distress about IV placement.    11 cor angio 3VD,  of LAD, BMS x3 to RCA, plavix minimum of one year . Plavix stopped 2014 10/2011 - cardiology planned to do a CT angiogram to determine the status of his right sided stents due to angina, however decided that h    Edema 2011    Formatting of this note might be different from the original. Initial work up: Bilateral doppler US 2011: Short segment occlusive thrombus in superficial gastrocnemius vein right upper medial calf. Lower extremities otherwise negative for DVT. BNP normal (39)  CXR normal Creatinine (2014): 0.77   Lasix started 2015 but did not improve edema so stopped.  Does not bother patient. Not se    Hyperlipidemia 2011    Formatting of this note might be different from the original. On high dose atorvastatin 80 mg Lipid panel (2013) - TChol: 171  Tri  HDL: 44  LDL: 114    Hypertension 2023    Formatting  of this note might be different from the original. Imdur CR 90 mg    Mild dementia (H) 11/21/2016    Prostate cancer metastatic to multiple sites (H) 11/14/2014    Formatting of this note might be different from the original. [place] Grade 9 Diagnosed 11/2014 when presented with acute urinary retention, PSA >90, multiple lytic bone lesions on imaging. Follows with Dr. Billings. Started Degarelix therapy 11/2014 (subcutaneous injections, presently q6 months).    Weakness 12/22/2017     PAST SURGICAL HISTORY:   has a past surgical history that includes hernia repair (2001); tonsillectomy; and Cv Coronary Angiogram (02/01/2011).  FAMILY HISTORY: family history includes Alzheimer Disease in his mother; Atrial fibrillation in his sister; Pancreatic Cancer in his father.  SOCIAL HISTORY:  reports that he has never smoked. He has never used smokeless tobacco. He reports that he does not currently use alcohol. He reports that he does not use drugs.    MEDICATIONS:  MED REC REQUIRED  Post Medication Reconciliation Status: patient was not discharged from an inpatient facility or TCU       Review of your medicines            Accurate as of January 16, 2024  8:40 AM. If you have any questions, ask your nurse or doctor.                CONTINUE these medicines which have NOT CHANGED        Dose / Directions   aspirin 81 MG EC tablet  Commonly known as: ASA      Dose: 81 mg  Take 81 mg by mouth daily  Refills: 0     citalopram 10 MG tablet  Commonly known as: celeXA      Dose: 10 mg  Take 10 mg by mouth daily  Refills: 0     senna-docusate 8.6-50 MG tablet  Commonly known as: SENOKOT-S/PERICOLACE      Dose: 1 tablet  Take 1 tablet by mouth daily  Refills: 0     tamsulosin 0.4 MG capsule  Commonly known as: FLOMAX      Dose: 0.4 mg  Take 0.4 mg by mouth daily  Refills: 0             Case Management:  I have reviewed the care plan and MDS and do agree with the plan. Patient's desire to return to the community is not assessible due to  "cognitive impairment. Information reviewed:  Medications, vital signs, orders, and nursing notes.    ROS:  Limited secondary to cognitive impairment but today pt reports no pain.     Vitals:  BP (!) 153/64   Pulse 60   Temp 97.3  F (36.3  C)   Resp 18   Ht 1.803 m (5' 11\")   Wt 89.1 kg (196 lb 6.4 oz)   SpO2 92%   BMI 27.39 kg/m    Body mass index is 27.39 kg/m .    Exam:  ENERAL APPEARANCE: NAD, in w/c  HEENT-EARS: No discharge/drainage, [place]   RESPIRATORY: Clear to auscultation, even and unlabored, symmetrical chest wall expansion  CARDIOVASCULAR: RRR, BLE nonpitting edema, S1/S2 normal  GASTROINTESTINAL: Soft, nontender, nondistended, bowel sounds active   MUSCULOSKELETAL: In w/c   INTEGUMENTARY: Visualized areas intact, no open skin  NEUROLOGICAL: Alert to self, memory loss, confusion  PSYCHOLOGICAL: Cooperative, calm     Lab/Diagnostic data:   Recent labs in [facility] reviewed by me today.      ASSESSMENT/PLAN  Dementia  Depression  [place]  -On no specific dementia medication, mood stable  -Continue Celexa 10 mg daily, continue to assist with ADLs as indicated, monitor for changes in mood, behavior, and functional status     History of prostate cancer  BPH  -No reports of  symptoms  -Continue tamsulosin 0.4 mg daily, PVR as needed, monitor for signs of retention     History of CAD  Hypertension  Hyperlipidemia  History of orthostatic hypotension  History of bradycardia  -Pulse as above, BP stable on no medications, no reports of chest pain or shortness of breath  -Continue aspirin, monitor BP and pulse, monitor for changes in condition     Slow transit constipation  -Stable on senna  -Continue senna 1 tab daily, monitor bowel function         Electronically signed by:  Judith Toledo,AYESHA,APRN,AGNP-BC.               The above note was completed in part using Dragon voice recognition software. Although reviewed after completion, some word and grammatical errors may occur. Please contact the author of this note with " any questions.

## 2024-02-12 NOTE — TELEPHONE ENCOUNTER
[facility] Triage Nurse Telephone Encounter    Provider: RODRI Huntley CNP  Facility: [facility] Facility Type:  LT    Caller: Diana   Call Back Number: [phone]    Allergies:  No Known Allergies     Reason for call:   T 97.7, P 54 /51, O2 91% ra, R19. Still coughing         Verbal Order/Direction given by Provider: NNO    Provider giving Order:  RODRI Huntley CNP    Verbal Order given to: Diana Carey RN

## 2024-03-05 NOTE — PROGRESS NOTES
Patient was seen for regulatory long-term care visit    Course reviewed with nursing home staff    Overall mental and functional status have been stable per nursing staff    Minimal history is obtained from patient secondary to cognitive impairment    Current medications were reviewed by me today    Patient is sitting in recliner in the common area  He is sleepy, alerts to name, is oriented to self.  Appears comfortable  HEENT: Oral mucosa moist  Affect blunted  Lungs clear  CV RRR  Abdomen soft, protuberant  No extremity edema  Neuro: Generalized weakness, no focal weakness noted      Assessment/plan    Dementia, stable mental functional status  History of depression, on citalopram  Recent weight gain of 20 pounds over the last 9 months  Plan: Continue current cares, assure appropriate diet    Hyperlipidemia  History of coronary artery disease  Not on statin  Plan: Routine lab monitoring    History of BPH  No evidence of urinary retention  Plan: Continue tamsulosin  Monitor for signs of renal retention      Arley Chowdhury MD

## 2024-03-27 NOTE — PROGRESS NOTES
"[facility] GERIATRICS    Chief Complaint   Patient presents with    Nursing Home Acute     HPI:  Chetan Heard is a 89 year old  (12/27/1931), who is being seen today for an episodic care visit at: [facility] () [[id number]]. Today's concern is: Left shin skin tear    I was asked by staff to see patient for left shin skin tear.  Today, pt found sitting up in wheelchair in the common area.  History limited.  Patient does not recall what happened to his left shin.  No pain.  Noted to have small scattered dry ablations to his left shin without signs of complications.     Of note, patient was seen in ED on 1/24 after a fall with head strike.  Imaging without acute intracranial pathology.      Afebrile.  No chest pain or shortness of breath.    Allergies, and PMH/PSH reviewed in [facility] today.  REVIEW OF SYSTEMS:  Limited secondary to cognitive impairment but today pt reports fine.     Objective:   /59   Pulse 54   Temp 98.1  F (36.7  C)   Resp 18   Ht 1.803 m (5' 11\")   Wt 88.1 kg (194 lb 3.2 oz)   SpO2 96%   BMI 27.09 kg/m      Exam:  ENERAL APPEARANCE: In no acute distress  RESPIRATORY: Clear to auscultation, even and unlabored, symmetrical chest wall expansion  CARDIOVASCULAR: RRR, BLE nonpitting edema, S1/S2 normal  GASTROINTESTINAL: Soft, nontender, nondistended, bowel sounds active   INTEGUMENTARY: as above   NEUROLOGICAL: Alert to self, memory loss, confusion  PSYCHOLOGICAL: Calm        Recent labs in [facility] reviewed by me today.     Assessment/Plan:  Left shin skin tear  -Suspect mild trauma, no signs of complications, no drainage  -Monitor skin integrity     Fall  -As above  -Fall precautions    MED REC REQUIRED  Post Medication Reconciliation Status: patient was not discharged from an inpatient facility or TCU           Electronically signed by:  Judith Toledo,AYESHA,APRN,AGNP-BC.               The above note was completed in part using Dragon voice recognition software. Although reviewed " after completion, some word and grammatical errors may occur. Please contact the author of this note with any questions.

## 2024-03-27 NOTE — LETTER
"    3/27/2024        RE: Chetan Heard  C/o Jina Heard  [address] [address]        [facility] GERIATRICS    Chief Complaint   Patient presents with     Nursing Home Acute     HPI:  Chetan Heard is a 89 year old  (12/27/1931), who is being seen today for an episodic care visit at: [facility] () [[id number]]. Today's concern is: Left shin skin tear    I was asked by staff to see patient for left shin skin tear.  Today, pt found sitting up in wheelchair in the common area.  History limited.  Patient does not recall what happened to his left shin.  No pain.  Noted to have small scattered dry ablations to his left shin without signs of complications.     Of note, patient was seen in ED on 1/24 after a fall with head strike.  Imaging without acute intracranial pathology.      Afebrile.  No chest pain or shortness of breath.    Allergies, and PMH/PSH reviewed in [facility] today.  REVIEW OF SYSTEMS:  Limited secondary to cognitive impairment but today pt reports fine.     Objective:   /59   Pulse 54   Temp 98.1  F (36.7  C)   Resp 18   Ht 1.803 m (5' 11\")   Wt 88.1 kg (194 lb 3.2 oz)   SpO2 96%   BMI 27.09 kg/m      Exam:  ENERAL APPEARANCE: In no acute distress  RESPIRATORY: Clear to auscultation, even and unlabored, symmetrical chest wall expansion  CARDIOVASCULAR: RRR, BLE nonpitting edema, S1/S2 normal  GASTROINTESTINAL: Soft, nontender, nondistended, bowel sounds active   INTEGUMENTARY: as above   NEUROLOGICAL: Alert to self, memory loss, confusion  PSYCHOLOGICAL: Calm        Recent labs in [facility] reviewed by me today.     Assessment/Plan:  Left shin skin tear  -Suspect mild trauma, no signs of complications, no drainage  -Monitor skin integrity     Fall  -As above  -Fall precautions    MED REC REQUIRED  Post Medication Reconciliation Status: patient was not discharged from an inpatient facility or TCU           Electronically signed by:  Judith oTledo,DNP,APRN,AGNP-BC. "               The above note was completed in part using Dragon voice recognition software. Although reviewed after completion, some word and grammatical errors may occur. Please contact the author of this note with any questions.       Sincerely,        RODRI Man CNP

## 2024-04-30 NOTE — PROGRESS NOTES
[facility] GERIATRICS  Chief Complaint   Patient presents with    USP Regulatory     [place] Medical Record Number:  [id number]  Place of Service where encounter took place:  [facility] () [[id number]]    HPI:    Chetan Heard  is 89 year old (1931), who is being seen today for a federally mandated E/M visit. Today's concerns are: Routine LTC follow up visit    At today's visit, patient found sitting up in wheelchair.  History limited secondary to underlying cognitive deficits.  Very [place].  Denies any physical concerns.  Was seen by me in 3/2024 for left shin skin tear, resolved now.  No reports of chest pain or shortness of breath.    ALLERGIES:Patient has no known allergies.  PAST MEDICAL HISTORY:   Past Medical History:   Diagnosis Date    CAD (coronary artery disease) 2011    Formatting of this note might be different from the original. Angiogram 92- noted ST depression with intense emotional distress about IV placement.    11 cor angio 3VD,  of LAD, BMS x3 to RCA, plavix minimum of one year . Plavix stopped 2014 10/2011 - cardiology planned to do a CT angiogram to determine the status of his right sided stents due to angina, however decided that h    Edema 2011    Formatting of this note might be different from the original. Initial work up: Bilateral doppler US 2011: Short segment occlusive thrombus in superficial gastrocnemius vein right upper medial calf. Lower extremities otherwise negative for DVT. BNP normal (39)  CXR normal Creatinine (2014): 0.77   Lasix started 2015 but did not improve edema so stopped.  Does not bother patient. Not se    Hyperlipidemia 2011    Formatting of this note might be different from the original. On high dose atorvastatin 80 mg Lipid panel (2013) - TChol: 171  Tri  HDL: 44  LDL: 114    Hypertension 2023    Formatting of this note might be different from the original. Imdur CR 90  mg    Mild dementia (H) 11/21/2016    Prostate cancer metastatic to multiple sites (H) 11/14/2014    Formatting of this note might be different from the original. Sravanthi Grade 9 Diagnosed 11/2014 when presented with acute urinary retention, PSA >90, multiple lytic bone lesions on imaging. Follows with Dr. Billings. Started Degarelix therapy 11/2014 (subcutaneous injections, presently q6 months).    Weakness 12/22/2017     PAST SURGICAL HISTORY:   has a past surgical history that includes hernia repair (2001); tonsillectomy; and Cv Coronary Angiogram (02/01/2011).  FAMILY HISTORY: family history includes Alzheimer Disease in his mother; Atrial fibrillation in his sister; Pancreatic Cancer in his father.  SOCIAL HISTORY:  reports that he has never smoked. He has never used smokeless tobacco. He reports that he does not currently use alcohol. He reports that he does not use drugs.    MEDICATIONS:  MED REC REQUIRED  Post Medication Reconciliation Status: patient was not discharged from an inpatient facility or TCU       Review of your medicines            Accurate as of April 30, 2024 11:59 PM. If you have any questions, ask your nurse or doctor.                CONTINUE these medicines which have NOT CHANGED        Dose / Directions   aspirin 81 MG EC tablet  Commonly known as: ASA      Dose: 81 mg  Take 81 mg by mouth daily  Refills: 0     citalopram 10 MG tablet  Commonly known as: celeXA      Dose: 10 mg  Take 10 mg by mouth daily  Refills: 0     senna-docusate 8.6-50 MG tablet  Commonly known as: SENOKOT-S/PERICOLACE      Dose: 1 tablet  Take 1 tablet by mouth daily  Refills: 0     tamsulosin 0.4 MG capsule  Commonly known as: FLOMAX      Dose: 0.4 mg  Take 0.4 mg by mouth daily  Refills: 0             Case Management:  I have reviewed the care plan and MDS and do agree with the plan. Patient's desire to return to the community is not assessible due to cognitive impairment. Information reviewed:  Medications, vital  "signs, orders, and nursing notes.    ROS:  Limited secondary to cognitive impairment but today pt reports fine.      Vitals:  /51   Pulse 57   Temp 97.3  F (36.3  C)   Resp 16   Ht 1.803 m (5' 11\")   Wt 85.1 kg (187 lb 9.6 oz)   SpO2 94%   BMI 26.16 kg/m    Body mass index is 26.16 kg/m .    Exam:  GENERAL APPEARANCE: NAD, in w/c  RESPIRATORY: Clear to auscultation, even and unlabored, symmetrical chest wall expansion  CARDIOVASCULAR: RRR, BLE nonpitting edema, S1/S2 normal  GASTROINTESTINAL: Soft, nontender, nondistended, bowel sounds active   NEUROLOGICAL: Alert to self, memory loss, confusion  PSYCHOLOGICAL: Cooperative, calm     Lab/Diagnostic data:   Recent labs in [facility] reviewed by me today.      ASSESSMENT/PLAN  Dementia  Depression  -Mood stable  -Continue Celexa 10 mg daily, continue to assist with ADLs as indicated, monitor for changes in mood, behavior, and functional status     History of prostate cancer  BPH  -No reports of  symptoms  -Continue tamsulosin 0.4 mg daily, PVR as needed, monitor for signs of retention          Electronically signed by:  Judith Toledo,DNP,APRN,AGNP-BC.               The above note was completed in part using Dragon voice recognition software. Although reviewed after completion, some word and grammatical errors may occur. Please contact the author of this note with any questions.            "

## 2024-06-11 NOTE — TELEPHONE ENCOUNTER
[facility] Geriatrics Triage Nurse Telephone Encounter    Provider: RODRI Huntley CNP  Facility: [facility] Facility Type:  LTC    Caller: Arelis ()  Call Back Number: [phone]    Allergies:  No Known Allergies     Reason for call: The patient has had a decline in status and family would like to have a hospice consult.     Verbal Order/Direction given by Provider: Okay for hospice eval and treat.     Provider giving Order:  RODRI Huntley CNP    Verbal Order given to: Franklyn Carey RN

## 2024-06-11 NOTE — TELEPHONE ENCOUNTER
[facility] Geriatrics Lab Note     Provider: RODRI Huntley CNP  Facility: [facility] Facility Type:  C    No Known Allergies    Labs Reviewed by provider: CXR. O2 sats 90-91% on 2LPM, <90% on RA.         Verbal Order/Direction given by Provider:   - Initiate and titrate supplemental O2 at 2 L/min via nasal cannula prn for dyspnea, hypoxia (O2 saturation < 88%) or acute angina; update provider with nursing assessment     Provider giving Order:  RODRI Huntley CNP    Verbal Order given to: Xi Stone RN

## 2024-06-12 NOTE — PROGRESS NOTES
Patient passed away tonight; nurse reports they are dnr/dni but was not hospice. Awaiting family to call back, then okay to release.     Leny Weaver, CNP